# Patient Record
Sex: FEMALE | Race: WHITE | NOT HISPANIC OR LATINO | Employment: FULL TIME | URBAN - METROPOLITAN AREA
[De-identification: names, ages, dates, MRNs, and addresses within clinical notes are randomized per-mention and may not be internally consistent; named-entity substitution may affect disease eponyms.]

---

## 2017-05-24 ENCOUNTER — OFFICE VISIT (OUTPATIENT)
Dept: URGENT CARE | Facility: CLINIC | Age: 37
End: 2017-05-24
Payer: COMMERCIAL

## 2017-05-24 VITALS
OXYGEN SATURATION: 97 % | WEIGHT: 211 LBS | HEART RATE: 88 BPM | TEMPERATURE: 98.2 F | DIASTOLIC BLOOD PRESSURE: 74 MMHG | HEIGHT: 65 IN | RESPIRATION RATE: 16 BRPM | SYSTOLIC BLOOD PRESSURE: 112 MMHG | BODY MASS INDEX: 35.16 KG/M2

## 2017-05-24 DIAGNOSIS — B96.89 ACUTE BACTERIAL SINUSITIS: ICD-10-CM

## 2017-05-24 DIAGNOSIS — R05.9 COUGH: ICD-10-CM

## 2017-05-24 DIAGNOSIS — J01.90 ACUTE BACTERIAL SINUSITIS: ICD-10-CM

## 2017-05-24 PROCEDURE — 99214 OFFICE O/P EST MOD 30 MIN: CPT | Performed by: NURSE PRACTITIONER

## 2017-05-24 RX ORDER — BENZONATATE 200 MG/1
200 CAPSULE ORAL 3 TIMES DAILY PRN
Qty: 60 CAP | Refills: 0 | Status: SHIPPED | OUTPATIENT
Start: 2017-05-24 | End: 2017-07-30

## 2017-05-24 RX ORDER — GABAPENTIN 300 MG/1
300 CAPSULE ORAL 3 TIMES DAILY
COMMUNITY

## 2017-05-24 RX ORDER — AMOXICILLIN AND CLAVULANATE POTASSIUM 875; 125 MG/1; MG/1
1 TABLET, FILM COATED ORAL 2 TIMES DAILY
Qty: 14 TAB | Refills: 0 | Status: SHIPPED | OUTPATIENT
Start: 2017-05-24 | End: 2017-07-30

## 2017-05-24 ASSESSMENT — ENCOUNTER SYMPTOMS
SHORTNESS OF BREATH: 1
CHILLS: 0
SORE THROAT: 1
NAUSEA: 0
WHEEZING: 0
COUGH: 1
MYALGIAS: 0
RHINORRHEA: 1
DIZZINESS: 0
HEADACHES: 1
FEVER: 0
VOMITING: 0

## 2017-05-24 NOTE — MR AVS SNAPSHOT
"        Nicole C Ashley   2017 9:45 AM   Office Visit   MRN: [id number]    Department:  [facility]   Dept Phone:  [phone]    Description:  Female : 1980   Provider:  MARY JANE Starks           Reason for Visit     Cough x 2 weeks/ with mucus/running nose and sore throat      Allergies as of 2017     Allergen Noted Reactions    Wellbutrin [Bupropion] 2015       Rapid HR      You were diagnosed with     Acute bacterial sinusitis   [714952]       Cough   [786.2.ICD-9-CM]         Vital Signs     Blood Pressure Pulse Temperature Respirations Height Weight    112/74 mmHg 88 36.8 °C (98.2 °F) 16 1.651 m (5' 5\") 95.709 kg (211 lb)    Body Mass Index Oxygen Saturation Last Menstrual Period Breastfeeding? Smoking Status       35.11 kg/m2 97% 2016 No Former Smoker       Basic Information     Date Of Birth Sex Race Ethnicity Preferred Language    1980 Female White Non- English      Problem List              ICD-10-CM Priority Class Noted - Resolved    Gluteal tendinitis M76.00   6/3/2011 - Present    Neck pain M54.2   2016 - Present    Health care maintenance Z00.00   2016 - Present      Health Maintenance        Date Due Completion Dates    IMM DTaP/Tdap/Td Vaccine (1 - Tdap) 2001 ---    PAP SMEAR 2018 (Postponed)    Override on 2015: Postponed            Current Immunizations     No immunizations on file.      Below and/or attached are the medications your provider expects you to take. Review all of your home medications and newly ordered medications with your provider and/or pharmacist. Follow medication instructions as directed by your provider and/or pharmacist. Please keep your medication list with you and share with your provider. Update the information when medications are discontinued, doses are changed, or new medications (including over-the-counter products) are added; and carry medication information at all times in the " event of emergency situations     Allergies:  WELLBUTRIN - (reactions not documented)               Medications  Valid as of: May 24, 2017 - 10:02 AM    Generic Name Brand Name Tablet Size Instructions for use    Amoxicillin-Pot Clavulanate (Tab) AUGMENTIN 875-125 MG Take 1 Tab by mouth 2 times a day.        Benzonatate (Cap) TESSALON 200 MG Take 1 Cap by mouth 3 times a day as needed for Cough.        Gabapentin (Cap) NEURONTIN 300 MG Take 300 mg by mouth 3 times a day.        Omeprazole Magnesium (Tablet Delayed Response) PRILOSEC OTC 20 MG Take 1 Tab by mouth every day.        Orphenadrine Citrate (TABLET SR 12 HR) NORFLEX 100 MG Take 1 Tab by mouth 2 times a day.        TiZANidine HCl (Tab) ZANAFLEX 4 MG Take 1 Tab by mouth 3 times a day.        TraMADol HCl (Tab) ULTRAM 50 MG Take 1 Tab by mouth every 8 hours as needed. TITRATE UP TO 2 CAPSULES BY MOUTH THREE TIMES DAILY        .                 Medicines prescribed today were sent to:     [id number] - [place], [place] - [address] [address]    Phone: [phone] Fax: [phone]    Open 24 Hours?: No      Medication refill instructions:       If your prescription bottle indicates you have medication refills left, it is not necessary to call your provider’s office. Please contact your pharmacy and they will refill your medication.    If your prescription bottle indicates you do not have any refills left, you may request refills at any time through one of the following ways: The online [employer] unlimited system (except Urgent Care), by calling your provider’s office, or by asking your pharmacy to contact your provider’s office with a refill request. Medication refills are processed only during regular business hours and may not be available until the next business day. Your provider may request additional information or to have a follow-up visit with you prior to refilling your medication.   *Please Note: Medication refills are  assigned a new Rx number when refilled electronically. Your pharmacy may indicate that no refills were authorized even though a new prescription for the same medication is available at the pharmacy. Please request the medicine by name with the pharmacy before contacting your provider for a refill.           MyChart Access Code: Activation code not generated  Current [employer] Status: Active

## 2017-05-24 NOTE — PROGRESS NOTES
"Subjective:      Nicole Ramirez is a 37 y.o. female who presents with Cough            Cough  This is a new problem. The current episode started 1 to 4 weeks ago. The problem has been unchanged. The problem occurs constantly. The cough is productive of brown sputum. Associated symptoms include headaches, nasal congestion, postnasal drip, rhinorrhea, a sore throat and shortness of breath. Pertinent negatives include no chills, fever, myalgias or wheezing. She has tried OTC cough suppressant for the symptoms. The treatment provided mild relief.     Allergies, medications and history reviewed by me today    Review of Systems   Constitutional: Negative for fever and chills.   HENT: Positive for postnasal drip, rhinorrhea and sore throat.    Respiratory: Positive for cough and shortness of breath. Negative for wheezing.    Gastrointestinal: Negative for nausea and vomiting.   Musculoskeletal: Negative for myalgias.   Neurological: Positive for headaches. Negative for dizziness.          Objective:     /74 mmHg  Pulse 88  Temp(Src) 36.8 °C (98.2 °F)  Resp 16  Ht 1.651 m (5' 5\")  Wt 95.709 kg (211 lb)  BMI 35.11 kg/m2  SpO2 97%  LMP 08/18/2016  Breastfeeding? No     Physical Exam   Constitutional: She is oriented to person, place, and time. She appears well-developed and well-nourished. No distress.   HENT:   Head: Normocephalic and atraumatic.   Right Ear: External ear and ear canal normal. Tympanic membrane is not injected and not perforated. No middle ear effusion.   Left Ear: External ear and ear canal normal. Tympanic membrane is not injected and not perforated.  No middle ear effusion.   Nose: Mucosal edema present.   Mouth/Throat: Posterior oropharyngeal erythema present. No oropharyngeal exudate.   Eyes: Conjunctivae are normal. Right eye exhibits no discharge. Left eye exhibits no discharge.   Neck: Normal range of motion. Neck supple.   Cardiovascular: Normal rate, regular rhythm and normal heart " sounds.    No murmur heard.  Pulmonary/Chest: Effort normal and breath sounds normal. No respiratory distress. She has no wheezes. She has no rales.   Musculoskeletal: Normal range of motion.   Normal movement of all 4 extremities.   Lymphadenopathy:     She has no cervical adenopathy.        Right: No supraclavicular adenopathy present.        Left: No supraclavicular adenopathy present.   Neurological: She is alert and oriented to person, place, and time. Gait normal.   Skin: Skin is warm and dry.   Psychiatric: She has a normal mood and affect. Her behavior is normal. Thought content normal.   Nursing note and vitals reviewed.              Assessment/Plan:     1. Acute bacterial sinusitis  amoxicillin-clavulanate (AUGMENTIN) 875-125 MG Tab   2. Cough  benzonatate (TESSALON) 200 MG capsule     Differential diagnosis, natural history, supportive care, and indications for immediate follow-up discussed at length.

## 2017-07-29 ENCOUNTER — OFFICE VISIT (OUTPATIENT)
Dept: URGENT CARE | Facility: PHYSICIAN GROUP | Age: 37
End: 2017-07-29
Payer: COMMERCIAL

## 2017-07-29 VITALS
SYSTOLIC BLOOD PRESSURE: 122 MMHG | HEART RATE: 112 BPM | BODY MASS INDEX: 34.16 KG/M2 | DIASTOLIC BLOOD PRESSURE: 80 MMHG | OXYGEN SATURATION: 95 % | WEIGHT: 205 LBS | HEIGHT: 65 IN | TEMPERATURE: 99 F

## 2017-07-29 DIAGNOSIS — J02.9 SORE THROAT: ICD-10-CM

## 2017-07-29 DIAGNOSIS — R10.13 ACUTE EPIGASTRIC PAIN: ICD-10-CM

## 2017-07-29 DIAGNOSIS — Z87.19 H/O HIATAL HERNIA: ICD-10-CM

## 2017-07-29 DIAGNOSIS — Z87.19 H/O GASTROESOPHAGEAL REFLUX (GERD): ICD-10-CM

## 2017-07-29 LAB
INT CON NEG: NORMAL
INT CON POS: NORMAL
S PYO AG THROAT QL: NORMAL

## 2017-07-29 PROCEDURE — 99214 OFFICE O/P EST MOD 30 MIN: CPT | Performed by: NURSE PRACTITIONER

## 2017-07-29 PROCEDURE — 87880 STREP A ASSAY W/OPTIC: CPT | Performed by: NURSE PRACTITIONER

## 2017-07-29 RX ORDER — MAGNESIUM OXIDE 400 MG/1
400 TABLET ORAL DAILY
COMMUNITY
End: 2017-12-18

## 2017-07-29 RX ORDER — PANTOPRAZOLE SODIUM 40 MG/1
40 TABLET, DELAYED RELEASE ORAL DAILY
Qty: 30 TAB | Refills: 0 | Status: SHIPPED | OUTPATIENT
Start: 2017-07-29 | End: 2017-08-15

## 2017-07-29 ASSESSMENT — ENCOUNTER SYMPTOMS
ABDOMINAL PAIN: 1
WEAKNESS: 0
DIARRHEA: 0
NAUSEA: 0
COUGH: 0
VOMITING: 0
DIZZINESS: 0
HEADACHES: 0
CHILLS: 0
PALPITATIONS: 0
FEVER: 0
BACK PAIN: 0
ORTHOPNEA: 0
HEARTBURN: 1
CONSTIPATION: 0
MYALGIAS: 0
SHORTNESS OF BREATH: 0

## 2017-07-29 NOTE — PROGRESS NOTES
"Subjective:      Nicole Ramirez is a 37 y.o. female who presents with Pharyngitis            Pharyngitis   Associated symptoms include abdominal pain. Pertinent negatives include no coughing, diarrhea, headaches, shortness of breath or vomiting.   Nicole is a 37 year old female who is here for sore throat and epigastric pain. Mother present.  has had low grade fever of \"100 degrees\". C/o sore throat x 3 days. Denies n/v or abdominal pain. States taking prilosec daily but has not worked lately. States will take prilosec at night. H/o GERD, hiatal hernia. Had a barium swallow 9/2016 with dx of GERD and hiatal hernia. Placed on prilosec at that time, did not follow up with PCP with new med.  has recently \"cut stress out of life\".     PMH:  has a past medical history of Sinusitis, chronic; Allergy; and Muscle spasm.  MEDS:   Current outpatient prescriptions:   •  vitamin D (CHOLECALCIFEROL) 1000 UNIT Tab, Take 1,000 Units by mouth every day., Disp: , Rfl:   •  magnesium oxide (MAG-OX) 400 MG Tab, Take 400 mg by mouth every day., Disp: , Rfl:   •  hyoscyamine-maalox plus-lidocaine viscous (GI COCKTAIL), Take 30 mL by mouth Once for 1 dose., Disp: 30 mL, Rfl: 0  •  pantoprazole (PROTONIX) 40 MG Tablet Delayed Response, Take 1 Tab by mouth every day. Indications: Gastroesophageal Reflux Disease, Disp: 30 Tab, Rfl: 0  •  gabapentin (NEURONTIN) 300 MG Cap, Take 300 mg by mouth 3 times a day., Disp: , Rfl:   •  tramadol (ULTRAM) 50 MG Tab, Take 1 Tab by mouth every 8 hours as needed. TITRATE UP TO 2 CAPSULES BY MOUTH THREE TIMES DAILY, Disp: 90 Tab, Rfl: 1  •  omeprazole (PRILOSEC OTC) 20 MG tablet, Take 1 Tab by mouth every day., Disp: 90 Tab, Rfl: 1  •  amoxicillin-clavulanate (AUGMENTIN) 875-125 MG Tab, Take 1 Tab by mouth 2 times a day., Disp: 14 Tab, Rfl: 0  •  benzonatate (TESSALON) 200 MG capsule, Take 1 Cap by mouth 3 times a day as needed for Cough., Disp: 60 Cap, Rfl: 0  •  orphenadrine (NORFLEX) 100 MG " "tablet, Take 1 Tab by mouth 2 times a day., Disp: 60 Tab, Rfl: 1  •  tizanidine (ZANAFLEX) 4 MG Tab, Take 1 Tab by mouth 3 times a day., Disp: 30 Tab, Rfl: 5  ALLERGIES:   Allergies   Allergen Reactions   • Morphine Shortness of Breath   • Wellbutrin [Bupropion]      Rapid HR     SURGHX:   Past Surgical History   Procedure Laterality Date   • Primary c section     • Plastic surgery  2007     breast reduction     SOCHX:  reports that she quit smoking about 9 years ago. Her smoking use included Cigarettes. She quit after 14 years of use. She has never used smokeless tobacco. She reports that she does not drink alcohol or use illicit drugs.  FH: Family history was reviewed, no pertinent findings to report      Review of Systems   Constitutional: Negative for fever, chills and malaise/fatigue.   Respiratory: Negative for cough and shortness of breath.    Cardiovascular: Negative for chest pain, palpitations and orthopnea.   Gastrointestinal: Positive for heartburn and abdominal pain. Negative for nausea, vomiting, diarrhea and constipation.   Musculoskeletal: Negative for myalgias and back pain.   Neurological: Negative for dizziness, weakness and headaches.   All other systems reviewed and are negative.         Objective:     /80 mmHg  Pulse 112  Temp(Src) 37.2 °C (99 °F)  Ht 1.651 m (5' 5\")  Wt 92.987 kg (205 lb)  BMI 34.11 kg/m2  SpO2 95%  LMP 08/18/2016     Physical Exam   Constitutional: She appears well-developed and well-nourished. No distress.   HENT:   Head: Normocephalic.   Eyes: Conjunctivae and EOM are normal. Pupils are equal, round, and reactive to light.   Neck: Normal range of motion. Neck supple.   Cardiovascular: Regular rhythm and normal pulses.  Tachycardia present.    Pulses:       Radial pulses are 2+ on the right side, and 2+ on the left side.   Pulmonary/Chest: Effort normal and breath sounds normal. No accessory muscle usage. No respiratory distress.   Skin: She is not diaphoretic. "   Vitals reviewed.              Assessment/Plan:     1. Sore throat    - POCT Rapid Strep A: NEG    2. Acute epigastric pain    - hyoscyamine-maalox plus-lidocaine viscous (GI COCKTAIL); Take 30 mL by mouth Once for 1 dose.  Dispense: 30 mL; Refill: 0  - pantoprazole (PROTONIX) 40 MG Tablet Delayed Response; Take 1 Tab by mouth every day. Indications: Gastroesophageal Reflux Disease  Dispense: 30 Tab; Refill: 0  - REFERRAL TO GASTROENTEROLOGY    3. H/O gastroesophageal reflux (GERD)    - pantoprazole (PROTONIX) 40 MG Tablet Delayed Response; Take 1 Tab by mouth every day. Indications: Gastroesophageal Reflux Disease  Dispense: 30 Tab; Refill: 0  - REFERRAL TO GASTROENTEROLOGY    4. H/O hiatal hernia    - REFERRAL TO GASTROENTEROLOGY    Maintain hydration status  Get rest  Practice stress reduction techniques prn  Monitor for fever, severe abdominal pain, continuous fever, n/v, unable to urinate or have bowel movement- need re-evaluation- may go to ER, patient understands this  Follow up with GI and recommend new PCP

## 2017-07-30 ENCOUNTER — HOSPITAL ENCOUNTER (EMERGENCY)
Facility: MEDICAL CENTER | Age: 37
End: 2017-07-31
Attending: EMERGENCY MEDICINE
Payer: COMMERCIAL

## 2017-07-30 DIAGNOSIS — R10.13 EPIGASTRIC PAIN: ICD-10-CM

## 2017-07-30 LAB
ALBUMIN SERPL BCP-MCNC: 4.1 G/DL (ref 3.2–4.9)
ALBUMIN/GLOB SERPL: 1.2 G/DL
ALP SERPL-CCNC: 71 U/L (ref 30–99)
ALT SERPL-CCNC: 43 U/L (ref 2–50)
ANION GAP SERPL CALC-SCNC: 11 MMOL/L (ref 0–11.9)
AST SERPL-CCNC: 34 U/L (ref 12–45)
BASOPHILS # BLD AUTO: 0.6 % (ref 0–1.8)
BASOPHILS # BLD: 0.07 K/UL (ref 0–0.12)
BILIRUB SERPL-MCNC: 0.6 MG/DL (ref 0.1–1.5)
BUN SERPL-MCNC: 9 MG/DL (ref 8–22)
CALCIUM SERPL-MCNC: 8.6 MG/DL (ref 8.4–10.2)
CHLORIDE SERPL-SCNC: 104 MMOL/L (ref 96–112)
CO2 SERPL-SCNC: 21 MMOL/L (ref 20–33)
CREAT SERPL-MCNC: 0.91 MG/DL (ref 0.5–1.4)
EKG IMPRESSION: NORMAL
EOSINOPHIL # BLD AUTO: 0.01 K/UL (ref 0–0.51)
EOSINOPHIL NFR BLD: 0.1 % (ref 0–6.9)
ERYTHROCYTE [DISTWIDTH] IN BLOOD BY AUTOMATED COUNT: 39.7 FL (ref 35.9–50)
GFR SERPL CREATININE-BSD FRML MDRD: >60 ML/MIN/1.73 M 2
GLOBULIN SER CALC-MCNC: 3.4 G/DL (ref 1.9–3.5)
GLUCOSE SERPL-MCNC: 103 MG/DL (ref 65–99)
HCG SERPL QL: NEGATIVE
HCT VFR BLD AUTO: 36.9 % (ref 37–47)
HGB BLD-MCNC: 13.1 G/DL (ref 12–16)
IMM GRANULOCYTES # BLD AUTO: 0.05 K/UL (ref 0–0.11)
IMM GRANULOCYTES NFR BLD AUTO: 0.5 % (ref 0–0.9)
LIPASE SERPL-CCNC: 28 U/L (ref 7–58)
LYMPHOCYTES # BLD AUTO: 1.59 K/UL (ref 1–4.8)
LYMPHOCYTES NFR BLD: 14.4 % (ref 22–41)
MCH RBC QN AUTO: 30.9 PG (ref 27–33)
MCHC RBC AUTO-ENTMCNC: 35.5 G/DL (ref 33.6–35)
MCV RBC AUTO: 87 FL (ref 81.4–97.8)
MONOCYTES # BLD AUTO: 0.85 K/UL (ref 0–0.85)
MONOCYTES NFR BLD AUTO: 7.7 % (ref 0–13.4)
NEUTROPHILS # BLD AUTO: 8.5 K/UL (ref 2–7.15)
NEUTROPHILS NFR BLD: 76.7 % (ref 44–72)
NRBC # BLD AUTO: 0 K/UL
NRBC BLD AUTO-RTO: 0 /100 WBC
PLATELET # BLD AUTO: 202 K/UL (ref 164–446)
PMV BLD AUTO: 10.5 FL (ref 9–12.9)
POTASSIUM SERPL-SCNC: 3.6 MMOL/L (ref 3.6–5.5)
PROT SERPL-MCNC: 7.5 G/DL (ref 6–8.2)
RBC # BLD AUTO: 4.24 M/UL (ref 4.2–5.4)
SODIUM SERPL-SCNC: 136 MMOL/L (ref 135–145)
WBC # BLD AUTO: 11.1 K/UL (ref 4.8–10.8)

## 2017-07-30 PROCEDURE — 700105 HCHG RX REV CODE 258: Performed by: EMERGENCY MEDICINE

## 2017-07-30 PROCEDURE — A9270 NON-COVERED ITEM OR SERVICE: HCPCS | Performed by: EMERGENCY MEDICINE

## 2017-07-30 PROCEDURE — 85025 COMPLETE CBC W/AUTO DIFF WBC: CPT

## 2017-07-30 PROCEDURE — 700102 HCHG RX REV CODE 250 W/ 637 OVERRIDE(OP): Performed by: EMERGENCY MEDICINE

## 2017-07-30 PROCEDURE — 83690 ASSAY OF LIPASE: CPT

## 2017-07-30 PROCEDURE — 84703 CHORIONIC GONADOTROPIN ASSAY: CPT

## 2017-07-30 PROCEDURE — 99284 EMERGENCY DEPT VISIT MOD MDM: CPT

## 2017-07-30 PROCEDURE — 36415 COLL VENOUS BLD VENIPUNCTURE: CPT

## 2017-07-30 PROCEDURE — 96360 HYDRATION IV INFUSION INIT: CPT

## 2017-07-30 PROCEDURE — 93005 ELECTROCARDIOGRAM TRACING: CPT

## 2017-07-30 PROCEDURE — 80053 COMPREHEN METABOLIC PANEL: CPT

## 2017-07-30 RX ORDER — HYDROXYCHLOROQUINE SULFATE 200 MG/1
200 TABLET, FILM COATED ORAL 2 TIMES DAILY
COMMUNITY

## 2017-07-30 RX ORDER — TRAMADOL HYDROCHLORIDE 50 MG/1
50 TABLET ORAL EVERY 8 HOURS PRN
Qty: 16 TAB | Refills: 0 | Status: SHIPPED | OUTPATIENT
Start: 2017-07-30 | End: 2017-07-30

## 2017-07-30 RX ORDER — HYDROCODONE BITARTRATE AND ACETAMINOPHEN 5; 325 MG/1; MG/1
1 TABLET ORAL ONCE
Status: COMPLETED | OUTPATIENT
Start: 2017-07-30 | End: 2017-07-30

## 2017-07-30 RX ORDER — SODIUM CHLORIDE 9 MG/ML
1000 INJECTION, SOLUTION INTRAVENOUS ONCE
Status: COMPLETED | OUTPATIENT
Start: 2017-07-30 | End: 2017-07-30

## 2017-07-30 RX ORDER — HYDROCODONE BITARTRATE AND ACETAMINOPHEN 5; 325 MG/1; MG/1
1-2 TABLET ORAL EVERY 6 HOURS PRN
Qty: 16 TAB | Refills: 0 | Status: SHIPPED | OUTPATIENT
Start: 2017-07-30 | End: 2017-08-15

## 2017-07-30 RX ADMIN — SODIUM CHLORIDE 1000 ML: 9 INJECTION, SOLUTION INTRAVENOUS at 22:34

## 2017-07-30 RX ADMIN — HYDROCODONE BITARTRATE AND ACETAMINOPHEN 1 TABLET: 5; 325 TABLET ORAL at 22:34

## 2017-07-30 RX ADMIN — LIDOCAINE HYDROCHLORIDE 30 ML: 20 SOLUTION OROPHARYNGEAL at 22:35

## 2017-07-30 ASSESSMENT — PAIN SCALES - GENERAL: PAINLEVEL_OUTOF10: 7

## 2017-07-30 NOTE — ED AVS SNAPSHOT
7/31/2017    Nicole Ramirez  [address] [facility] [place] [address]    Dear Nicole MARIN:    [facility] wants to ensure your discharge home is safe and you or your loved ones have had all of your questions answered regarding your care after you leave the hospital.    Below is a list of resources and contact information should you have any questions regarding your hospital stay, follow-up instructions, or active medical symptoms.    Questions or Concerns Regarding… Contact   Medical Questions Related to Your Discharge  (7 days a week, 8am-5pm) Contact a Nurse Care Coordinator   [phone]   Medical Questions Not Related to Your Discharge  (24 hours a day / 7 days a week)  Contact the Nurse Health Line   [phone]    Medications or Discharge Instructions Refer to your discharge packet   or contact your [facility] Primary Care Provider   [phone]   Follow-up Appointment(s) Schedule your appointment via [facility]   or contact Scheduling [phone]   Billing Review your statement via [facility]  or contact Billing [phone]   Medical Records Review your records via [facility]   or contact Medical Records [phone]     You may receive a telephone call within two days of discharge. This call is to make certain you understand your discharge instructions and have the opportunity to have any questions answered. You can also easily access your medical information, test results and upcoming appointments via the [facility] free online health management tool. You can learn more and sign up at [employer]. For assistance setting up your [facility] account, please call [phone].    Once again, we want to ensure your discharge home is safe and that you have a clear understanding of any next steps in your care. If you have any questions or concerns, please do not hesitate to contact us, we are here for you. Thank you for choosing [facility] for your healthcare needs.    Sincerely,    Your [facility] Healthcare Team

## 2017-07-30 NOTE — ED AVS SNAPSHOT
Home Care Instructions                                                                                                                Nicole Ramirez   MRN: [id number]    Department:  [facility], Emergency Dept   Date of Visit:  7/30/2017            [facility], Emergency Dept    [address]    [place] [address]    Phone:  [phone]      You were seen by     Vitor Reece M.D.      Your Diagnosis Was     Epigastric pain     R10.13       These are the medications you received during your hospitalization from 07/30/2017 2128 to 07/31/2017 0005     Date/Time Order Dose Route Action    07/30/2017 2234 NS infusion 1,000 mL 1,000 mL Intravenous New Bag    07/30/2017 2234 hydrocodone-acetaminophen (NORCO) 5-325 MG per tablet 1 Tab 1 Tab Oral Given    07/30/2017 2235 hyoscyamine-maalox plus-lidocaine viscous (GI COCKTAIL) oral susp 30 mL 30 mL Oral Given      Follow-up Information     1. Follow up with Kelli Phillips M.D..    Specialty:  Family Medicine    Contact information    [address] [facility]  [address]  [facility] [address] [phone]          2. Follow up with [facility], Emergency Dept.    Specialty:  Emergency Medicine    Why:  As needed, If symptoms worsen    Contact information    [address] [facility] [address] [phone]        3. Schedule an appointment as soon as possible for a visit with [facility].    Contact information    [address] [facility] [address] [phone]      Medication Information     Review all of your home medications and newly ordered medications with your primary doctor and/or pharmacist as soon as possible. Follow medication instructions as directed by your doctor and/or pharmacist.     Please keep your complete medication list with you and share with your physician. Update the information when medications are discontinued, doses are changed, or new  medications (including over-the-counter products) are added; and carry medication information at all times in the event of emergency situations.               Medication List      START taking these medications        Instructions    Morning Afternoon Evening Bedtime    hydrocodone-acetaminophen 5-325 MG Tabs per tablet   Last time this was given:  1 Tab on 7/30/2017 10:34 PM   Commonly known as:  NORCO        Take 1-2 Tabs by mouth every 6 hours as needed.   Dose:  1-2 Tab                          ASK your doctor about these medications        Instructions    Morning Afternoon Evening Bedtime    gabapentin 300 MG Caps   Commonly known as:  NEURONTIN        Take 300 mg by mouth 3 times a day.   Dose:  300 mg                        hydroxychloroquine 200 MG Tabs   Commonly known as:  PLAQUENIL        Take 200 mg by mouth every day.   Dose:  200 mg                        magnesium oxide 400 MG Tabs   Commonly known as:  MAG-OX        Take 400 mg by mouth every day.   Dose:  400 mg                        omeprazole 20 MG tablet   Commonly known as:  PRILOSEC OTC        Doctor's comments:  Take one tablet twice daily for one week and then continue one tablet in the morning   Take 1 Tab by mouth every day.   Dose:  20 mg                        orphenadrine 100 MG tablet   Commonly known as:  NORFLEX        Take 1 Tab by mouth 2 times a day.   Dose:  100 mg                        pantoprazole 40 MG Tbec   Commonly known as:  PROTONIX        Take 1 Tab by mouth every day. Indications: Gastroesophageal Reflux Disease   Dose:  40 mg                        vitamin D 1000 UNIT Tabs   Commonly known as:  cholecalciferol        Take 1,000 Units by mouth every day.   Dose:  1000 Units                             Where to Get Your Medications      You can get these medications from any pharmacy     Bring a paper prescription for each of these medications    - hydrocodone-acetaminophen 5-325 MG Tabs per tablet            Procedures  and tests performed during your visit     BETA-HCG QUALITATIVE SERUM    CBC WITH DIFFERENTIAL    CMP    EKG (NOW)    ESTIMATED GFR    LIPASE        Discharge Instructions       Abdominal Pain, Adult  Many things can cause abdominal pain. Usually, abdominal pain is not caused by a disease and will improve without treatment. It can often be observed and treated at home. Your health care provider will do a physical exam and possibly order blood tests and X-rays to help determine the seriousness of your pain. However, in many cases, more time must pass before a clear cause of the pain can be found. Before that point, your health care provider may not know if you need more testing or further treatment.  HOME CARE INSTRUCTIONS   Monitor your abdominal pain for any changes. The following actions may help to alleviate any discomfort you are experiencing:  · Only take over-the-counter or prescription medicines as directed by your health care provider.  · Do not take laxatives unless directed to do so by your health care provider.  · Try a clear liquid diet (broth, tea, or water) as directed by your health care provider. Slowly move to a bland diet as tolerated.  SEEK MEDICAL CARE IF:  · You have unexplained abdominal pain.  · You have abdominal pain associated with nausea or diarrhea.  · You have pain when you urinate or have a bowel movement.  · You experience abdominal pain that wakes you in the night.  · You have abdominal pain that is worsened or improved by eating food.  · You have abdominal pain that is worsened with eating fatty foods.  · You have a fever.  SEEK IMMEDIATE MEDICAL CARE IF:   · Your pain does not go away within 2 hours.  · You keep throwing up (vomiting).  · Your pain is felt only in portions of the abdomen, such as the right side or the left lower portion of the abdomen.  · You pass bloody or black tarry stools.  MAKE SURE YOU:  · Understand these instructions.    · Will watch your condition.    · Will  get help right away if you are not doing well or get worse.       This information is not intended to replace advice given to you by your health care provider. Make sure you discuss any questions you have with your health care provider.     Document Released: 09/27/2006 Document Revised: 01/08/2016 Document Reviewed: 08/27/2014  [employer] Interactive Patient Education ©2016 [employer]            Patient Information     Patient Information    Following emergency treatment: all patient requiring follow-up care must return either to a private physician or a clinic if your condition worsens before you are able to obtain further medical attention, please return to the emergency room.     Billing Information    At [facility], we work to make the billing process streamlined for our patients.  Our Representatives are here to answer any questions you may have regarding your hospital bill.  If you have insurance coverage and have supplied your insurance information to us, we will submit a claim to your insurer on your behalf.  Should you have any questions regarding your bill, we can be reached online or by phone as follows:  Online: You are able pay your bills online or live chat with our representatives about any billing questions you may have. We are here to help Monday - Friday from 8:00am to 7:30pm and 9:00am - 12:00pm on Saturdays.  Please visit [email or web address]  for more information.   Phone:  [phone] or [phone]    Please note that your emergency physician, surgeon, pathologist, radiologist, anesthesiologist, and other specialists are not employed by [facility] and will therefore bill separately for their services.  Please contact them directly for any questions concerning their bills at the numbers below:     Emergency Physician Services:  [phone]  [facility]:  [phone]  [facility]:  [phone]  [facility]:   [phone]    1. Your final bill may vary from the amount quoted upon discharge if all procedures are not complete at that time, or if your doctor has additional procedures of which we are not aware. You will receive an additional bill if you return to the Emergency Department at [facility] for suture removal regardless of the facility of which the sutures were placed.     2. Please arrange for settlement of this account at the emergency registration.    3. All self-pay accounts are due in full at the time of treatment.  If you are unable to meet this obligation then payment is expected within 4-5 days.     4. If you have had radiology studies (CT, X-ray, Ultrasound, MRI), you have received a preliminary result during your emergency department visit. Please contact the radiology department [phone] to receive a copy of your final result. Please discuss the Final result with your primary physician or with the follow up physician provided.     Crisis Hotline:  [place] Crisis Hotline:  0-888-CRWRUGX or 1-495.571.8602  [place] Crisis Hotline:    1-622.920.9724 or 541-171-2545         ED Discharge Follow Up Questions    1. In order to provide you with very good care, we would like to follow up with a phone call in the next few days.  May we have your permission to contact you?     YES /  NO    2. What is the best phone number to call you? (       )_____-__________    3. What is the best time to call you?      Morning  /  Afternoon  /  Evening                   Patient Signature:  ____________________________________________________________    Date:  ____________________________________________________________

## 2017-07-30 NOTE — ED AVS SNAPSHOT
Waveborn Access Code: Activation code not generated  Current Waveborn Status: Active    Virtual View Apphart  A secure, online tool to manage your health information     [employer] Waveborn® is a secure, online tool that connects you to your personalized health information from the privacy of your home -- day or night - making it very easy for you to manage your healthcare. Once the activation process is completed, you can even access your medical information using the Waveborn elton, which is available for free in the Apple Elton store or Google Play store.     Waveborn provides the following levels of access (as shown below):   My Chart Features   [facility] Primary Care Doctor [facility]  Specialists [facility]  Urgent  Care Non-[facility]  Primary Care  Doctor   Email your healthcare team securely and privately 24/7 X X X X   Manage appointments: schedule your next appointment; view details of past/upcoming appointments X      Request prescription refills. X      View recent personal medical records, including lab and immunizations X X X X   View health record, including health history, allergies, medications X X X X   Read reports about your outpatient visits, procedures, consult and ER notes X X X X   See your discharge summary, which is a recap of your hospital and/or ER visit that includes your diagnosis, lab results, and care plan. X X       How to register for Waveborn:  1. Go to  [email or web address].  2. Click on the Sign Up Now box, which takes you to the New Member Sign Up page. You will need to provide the following information:  a. Enter your Waveborn Access Code exactly as it appears at the top of this page. (You will not need to use this code after you’ve completed the sign-up process. If you do not sign up before the expiration date, you must request a new code.)   b. Enter your date of birth.   c. Enter your home email address.   d. Click Submit, and follow the next screen’s instructions.  3. Create a Waveborn ID. This will  be your [employer] login ID and cannot be changed, so think of one that is secure and easy to remember.  4. Create a Widet[employer]ix password. You can change your password at any time.  5. Enter your Password Reset Question and Answer. This can be used at a later time if you forget your password.   6. Enter your e-mail address. This allows you to receive e-mail notifications when new information is available in [employer]et[employer]ix.  7. Click Sign Up. You can now view your health information.    For assistance activating your [employer]et[employer] account, call [phone]

## 2017-07-31 ENCOUNTER — APPOINTMENT (OUTPATIENT)
Dept: RADIOLOGY | Facility: MEDICAL CENTER | Age: 37
DRG: 419 | End: 2017-07-31
Attending: EMERGENCY MEDICINE
Payer: COMMERCIAL

## 2017-07-31 ENCOUNTER — OFFICE VISIT (OUTPATIENT)
Dept: MEDICAL GROUP | Facility: MEDICAL CENTER | Age: 37
End: 2017-07-31
Payer: COMMERCIAL

## 2017-07-31 ENCOUNTER — HOSPITAL ENCOUNTER (OUTPATIENT)
Facility: MEDICAL CENTER | Age: 37
End: 2017-07-31
Attending: PHYSICIAN ASSISTANT
Payer: COMMERCIAL

## 2017-07-31 ENCOUNTER — HOSPITAL ENCOUNTER (INPATIENT)
Facility: MEDICAL CENTER | Age: 37
LOS: 1 days | DRG: 419 | End: 2017-08-01
Attending: EMERGENCY MEDICINE | Admitting: SURGERY
Payer: COMMERCIAL

## 2017-07-31 VITALS
RESPIRATION RATE: 18 BRPM | TEMPERATURE: 97.8 F | BODY MASS INDEX: 35.16 KG/M2 | HEIGHT: 65 IN | DIASTOLIC BLOOD PRESSURE: 78 MMHG | HEART RATE: 96 BPM | SYSTOLIC BLOOD PRESSURE: 122 MMHG | WEIGHT: 211 LBS | OXYGEN SATURATION: 98 %

## 2017-07-31 VITALS
RESPIRATION RATE: 18 BRPM | TEMPERATURE: 98.1 F | HEIGHT: 65 IN | SYSTOLIC BLOOD PRESSURE: 112 MMHG | OXYGEN SATURATION: 98 % | DIASTOLIC BLOOD PRESSURE: 76 MMHG | WEIGHT: 211.64 LBS | HEART RATE: 95 BPM | BODY MASS INDEX: 35.26 KG/M2

## 2017-07-31 DIAGNOSIS — J03.90 ACUTE TONSILLITIS, UNSPECIFIED ETIOLOGY: ICD-10-CM

## 2017-07-31 DIAGNOSIS — R10.13 EPIGASTRIC PAIN: ICD-10-CM

## 2017-07-31 DIAGNOSIS — E66.9 OBESITY (BMI 30-39.9): ICD-10-CM

## 2017-07-31 DIAGNOSIS — J02.9 PHARYNGITIS, UNSPECIFIED ETIOLOGY: ICD-10-CM

## 2017-07-31 PROBLEM — K80.00 CHOLECYSTITIS, ACUTE WITH CHOLELITHIASIS: Status: ACTIVE | Noted: 2017-07-31

## 2017-07-31 LAB
ALBUMIN SERPL BCP-MCNC: 3.8 G/DL (ref 3.2–4.9)
ALBUMIN/GLOB SERPL: 1 G/DL
ALP SERPL-CCNC: 75 U/L (ref 30–99)
ALT SERPL-CCNC: 43 U/L (ref 2–50)
ANION GAP SERPL CALC-SCNC: 13 MMOL/L (ref 0–11.9)
APPEARANCE UR: ABNORMAL
AST SERPL-CCNC: 30 U/L (ref 12–45)
BACTERIA #/AREA URNS HPF: ABNORMAL /HPF
BASOPHILS # BLD AUTO: 0.3 % (ref 0–1.8)
BASOPHILS # BLD: 0.03 K/UL (ref 0–0.12)
BILIRUB SERPL-MCNC: 0.6 MG/DL (ref 0.1–1.5)
BILIRUB UR QL STRIP.AUTO: ABNORMAL
BUN SERPL-MCNC: 8 MG/DL (ref 8–22)
CALCIUM SERPL-MCNC: 8.7 MG/DL (ref 8.4–10.2)
CHLORIDE SERPL-SCNC: 105 MMOL/L (ref 96–112)
CO2 SERPL-SCNC: 19 MMOL/L (ref 20–33)
COLOR UR: YELLOW
CREAT SERPL-MCNC: 0.84 MG/DL (ref 0.5–1.4)
CRYSTALS 1718B: ABNORMAL /HPF
CRYSTALS 1718C: ABNORMAL /HPF
CULTURE IF INDICATED INDCX: YES UA CULTURE
EOSINOPHIL # BLD AUTO: 0.02 K/UL (ref 0–0.51)
EOSINOPHIL NFR BLD: 0.2 % (ref 0–6.9)
EPI CELLS #/AREA URNS HPF: ABNORMAL /HPF
ERYTHROCYTE [DISTWIDTH] IN BLOOD BY AUTOMATED COUNT: 39.2 FL (ref 35.9–50)
GFR SERPL CREATININE-BSD FRML MDRD: >60 ML/MIN/1.73 M 2
GLOBULIN SER CALC-MCNC: 3.7 G/DL (ref 1.9–3.5)
GLUCOSE SERPL-MCNC: 91 MG/DL (ref 65–99)
GLUCOSE UR STRIP.AUTO-MCNC: NEGATIVE MG/DL
HCG SERPL QL: NEGATIVE
HCT VFR BLD AUTO: 39 % (ref 37–47)
HGB BLD-MCNC: 13.7 G/DL (ref 12–16)
IMM GRANULOCYTES # BLD AUTO: 0.04 K/UL (ref 0–0.11)
IMM GRANULOCYTES NFR BLD AUTO: 0.4 % (ref 0–0.9)
INT CON NEG: NEGATIVE
INT CON POS: POSITIVE
KETONES UR STRIP.AUTO-MCNC: >=80 MG/DL
LEUKOCYTE ESTERASE UR QL STRIP.AUTO: NEGATIVE
LIPASE SERPL-CCNC: 30 U/L (ref 7–58)
LYMPHOCYTES # BLD AUTO: 0.94 K/UL (ref 1–4.8)
LYMPHOCYTES NFR BLD: 8.5 % (ref 22–41)
MCH RBC QN AUTO: 30.5 PG (ref 27–33)
MCHC RBC AUTO-ENTMCNC: 35.1 G/DL (ref 33.6–35)
MCV RBC AUTO: 86.9 FL (ref 81.4–97.8)
MICRO URNS: ABNORMAL
MONOCYTES # BLD AUTO: 0.31 K/UL (ref 0–0.85)
MONOCYTES NFR BLD AUTO: 2.8 % (ref 0–13.4)
MUCOUS THREADS #/AREA URNS HPF: ABNORMAL /HPF
NEUTROPHILS # BLD AUTO: 9.72 K/UL (ref 2–7.15)
NEUTROPHILS NFR BLD: 87.8 % (ref 44–72)
NITRITE UR QL STRIP.AUTO: NEGATIVE
NRBC # BLD AUTO: 0 K/UL
NRBC BLD AUTO-RTO: 0 /100 WBC
PH UR STRIP.AUTO: 8 [PH]
PLATELET # BLD AUTO: 233 K/UL (ref 164–446)
PMV BLD AUTO: 10.6 FL (ref 9–12.9)
POTASSIUM SERPL-SCNC: 4 MMOL/L (ref 3.6–5.5)
PROT SERPL-MCNC: 7.5 G/DL (ref 6–8.2)
PROT UR QL STRIP: NEGATIVE MG/DL
RBC # BLD AUTO: 4.49 M/UL (ref 4.2–5.4)
RBC # URNS HPF: ABNORMAL /HPF
RBC UR QL AUTO: ABNORMAL
S PYO AG THROAT QL: NORMAL
SODIUM SERPL-SCNC: 137 MMOL/L (ref 135–145)
SP GR UR STRIP.AUTO: 1.02
UNIDENT CRYS URNS QL MICRO: ABNORMAL /HPF
WBC # BLD AUTO: 11.1 K/UL (ref 4.8–10.8)
WBC #/AREA URNS HPF: ABNORMAL /HPF
YEAST #/AREA URNS HPF: ABNORMAL /HPF

## 2017-07-31 PROCEDURE — 36415 COLL VENOUS BLD VENIPUNCTURE: CPT

## 2017-07-31 PROCEDURE — 87086 URINE CULTURE/COLONY COUNT: CPT

## 2017-07-31 PROCEDURE — A9270 NON-COVERED ITEM OR SERVICE: HCPCS | Performed by: SURGERY

## 2017-07-31 PROCEDURE — 96374 THER/PROPH/DIAG INJ IV PUSH: CPT

## 2017-07-31 PROCEDURE — 76705 ECHO EXAM OF ABDOMEN: CPT

## 2017-07-31 PROCEDURE — 700101 HCHG RX REV CODE 250

## 2017-07-31 PROCEDURE — 87880 STREP A ASSAY W/OPTIC: CPT | Performed by: PHYSICIAN ASSISTANT

## 2017-07-31 PROCEDURE — 99214 OFFICE O/P EST MOD 30 MIN: CPT | Performed by: PHYSICIAN ASSISTANT

## 2017-07-31 PROCEDURE — 87070 CULTURE OTHR SPECIMN AEROBIC: CPT

## 2017-07-31 PROCEDURE — 96375 TX/PRO/DX INJ NEW DRUG ADDON: CPT

## 2017-07-31 PROCEDURE — 84703 CHORIONIC GONADOTROPIN ASSAY: CPT

## 2017-07-31 PROCEDURE — 700111 HCHG RX REV CODE 636 W/ 250 OVERRIDE (IP): Performed by: SURGERY

## 2017-07-31 PROCEDURE — 700105 HCHG RX REV CODE 258: Performed by: EMERGENCY MEDICINE

## 2017-07-31 PROCEDURE — 700111 HCHG RX REV CODE 636 W/ 250 OVERRIDE (IP): Performed by: EMERGENCY MEDICINE

## 2017-07-31 PROCEDURE — 770001 HCHG ROOM/CARE - MED/SURG/GYN PRIV*

## 2017-07-31 PROCEDURE — 80053 COMPREHEN METABOLIC PANEL: CPT

## 2017-07-31 PROCEDURE — 700101 HCHG RX REV CODE 250: Performed by: SURGERY

## 2017-07-31 PROCEDURE — 99285 EMERGENCY DEPT VISIT HI MDM: CPT

## 2017-07-31 PROCEDURE — 700105 HCHG RX REV CODE 258

## 2017-07-31 PROCEDURE — 83690 ASSAY OF LIPASE: CPT

## 2017-07-31 PROCEDURE — 81001 URINALYSIS AUTO W/SCOPE: CPT

## 2017-07-31 PROCEDURE — 700102 HCHG RX REV CODE 250 W/ 637 OVERRIDE(OP): Performed by: SURGERY

## 2017-07-31 PROCEDURE — 96376 TX/PRO/DX INJ SAME DRUG ADON: CPT

## 2017-07-31 PROCEDURE — 87077 CULTURE AEROBIC IDENTIFY: CPT

## 2017-07-31 PROCEDURE — 85025 COMPLETE CBC W/AUTO DIFF WBC: CPT

## 2017-07-31 PROCEDURE — 99000 SPECIMEN HANDLING OFFICE-LAB: CPT | Performed by: PHYSICIAN ASSISTANT

## 2017-07-31 RX ORDER — FAMOTIDINE 40 MG/1
40 TABLET, FILM COATED ORAL DAILY
Qty: 60 TAB | Refills: 0 | Status: SHIPPED | OUTPATIENT
Start: 2017-07-31 | End: 2017-08-15

## 2017-07-31 RX ORDER — GABAPENTIN 300 MG/1
300 CAPSULE ORAL 3 TIMES DAILY
Status: DISCONTINUED | OUTPATIENT
Start: 2017-07-31 | End: 2017-08-01 | Stop reason: HOSPADM

## 2017-07-31 RX ORDER — ONDANSETRON 2 MG/ML
4 INJECTION INTRAMUSCULAR; INTRAVENOUS EVERY 6 HOURS PRN
Status: DISCONTINUED | OUTPATIENT
Start: 2017-07-31 | End: 2017-08-01 | Stop reason: HOSPADM

## 2017-07-31 RX ORDER — PREDNISONE 20 MG/1
TABLET ORAL
Qty: 30 TAB | Refills: 0 | Status: SHIPPED | OUTPATIENT
Start: 2017-07-31 | End: 2017-08-15

## 2017-07-31 RX ORDER — ONDANSETRON 2 MG/ML
4 INJECTION INTRAMUSCULAR; INTRAVENOUS ONCE
Status: COMPLETED | OUTPATIENT
Start: 2017-07-31 | End: 2017-07-31

## 2017-07-31 RX ORDER — SODIUM CHLORIDE 9 MG/ML
1000 INJECTION, SOLUTION INTRAVENOUS ONCE
Status: COMPLETED | OUTPATIENT
Start: 2017-07-31 | End: 2017-07-31

## 2017-07-31 RX ORDER — HYDROXYCHLOROQUINE SULFATE 200 MG/1
200 TABLET, FILM COATED ORAL DAILY
Status: DISCONTINUED | OUTPATIENT
Start: 2017-08-01 | End: 2017-08-01 | Stop reason: HOSPADM

## 2017-07-31 RX ORDER — PREDNISONE 20 MG/1
20 TABLET ORAL 3 TIMES DAILY
Status: DISCONTINUED | OUTPATIENT
Start: 2017-07-31 | End: 2017-08-01 | Stop reason: HOSPADM

## 2017-07-31 RX ORDER — FAMOTIDINE 20 MG/1
20 TABLET, FILM COATED ORAL 2 TIMES DAILY
Status: DISCONTINUED | OUTPATIENT
Start: 2017-07-31 | End: 2017-07-31 | Stop reason: HOSPADM

## 2017-07-31 RX ORDER — DEXTROSE MONOHYDRATE, SODIUM CHLORIDE, AND POTASSIUM CHLORIDE 50; 1.49; 9 G/1000ML; G/1000ML; G/1000ML
INJECTION, SOLUTION INTRAVENOUS CONTINUOUS
Status: DISCONTINUED | OUTPATIENT
Start: 2017-07-31 | End: 2017-08-01 | Stop reason: HOSPADM

## 2017-07-31 RX ORDER — AMOXICILLIN 875 MG/1
875 TABLET, COATED ORAL 2 TIMES DAILY
Qty: 20 TAB | Refills: 0 | Status: SHIPPED | OUTPATIENT
Start: 2017-07-31 | End: 2017-08-10

## 2017-07-31 RX ORDER — ORPHENADRINE CITRATE 100 MG/1
100 TABLET, EXTENDED RELEASE ORAL 2 TIMES DAILY
Status: DISCONTINUED | OUTPATIENT
Start: 2017-07-31 | End: 2017-07-31

## 2017-07-31 RX ADMIN — CEFOTETAN DISODIUM: 1 INJECTION, POWDER, FOR SOLUTION INTRAMUSCULAR; INTRAVENOUS at 22:56

## 2017-07-31 RX ADMIN — GABAPENTIN 300 MG: 300 CAPSULE ORAL at 22:55

## 2017-07-31 RX ADMIN — ONDANSETRON 4 MG: 2 INJECTION INTRAMUSCULAR; INTRAVENOUS at 20:04

## 2017-07-31 RX ADMIN — HYDROMORPHONE HYDROCHLORIDE 1 MG: 1 INJECTION, SOLUTION INTRAMUSCULAR; INTRAVENOUS; SUBCUTANEOUS at 20:04

## 2017-07-31 RX ADMIN — SODIUM CHLORIDE 1000 ML: 9 INJECTION, SOLUTION INTRAVENOUS at 20:03

## 2017-07-31 RX ADMIN — PREDNISONE 20 MG: 20 TABLET ORAL at 22:55

## 2017-07-31 RX ADMIN — POTASSIUM CHLORIDE, DEXTROSE MONOHYDRATE AND SODIUM CHLORIDE: 150; 5; 900 INJECTION, SOLUTION INTRAVENOUS at 22:55

## 2017-07-31 RX ADMIN — FAMOTIDINE 20 MG: 10 INJECTION, SOLUTION INTRAVENOUS at 22:55

## 2017-07-31 RX ADMIN — HYDROMORPHONE HYDROCHLORIDE 1 MG: 1 INJECTION, SOLUTION INTRAMUSCULAR; INTRAVENOUS; SUBCUTANEOUS at 22:20

## 2017-07-31 RX ADMIN — HYDROMORPHONE HYDROCHLORIDE 0.5 MG: 1 INJECTION, SOLUTION INTRAMUSCULAR; INTRAVENOUS; SUBCUTANEOUS at 21:45

## 2017-07-31 ASSESSMENT — LIFESTYLE VARIABLES
CONSUMPTION TOTAL: NEGATIVE
HAVE YOU EVER FELT YOU SHOULD CUT DOWN ON YOUR DRINKING: NO
TOTAL SCORE: 0
EVER HAD A DRINK FIRST THING IN THE MORNING TO STEADY YOUR NERVES TO GET RID OF A HANGOVER: NO
ON A TYPICAL DAY WHEN YOU DRINK ALCOHOL HOW MANY DRINKS DO YOU HAVE: 2
TOTAL SCORE: 0
AVERAGE NUMBER OF DAYS PER WEEK YOU HAVE A DRINK CONTAINING ALCOHOL: 0
EVER FELT BAD OR GUILTY ABOUT YOUR DRINKING: NO
DO YOU DRINK ALCOHOL: YES
HOW MANY TIMES IN THE PAST YEAR HAVE YOU HAD 5 OR MORE DRINKS IN A DAY: 0
TOTAL SCORE: 0
EVER_SMOKED: YES
HAVE PEOPLE ANNOYED YOU BY CRITICIZING YOUR DRINKING: NO

## 2017-07-31 ASSESSMENT — PAIN SCALES - GENERAL
PAINLEVEL_OUTOF10: 4
PAINLEVEL_OUTOF10: 2
PAINLEVEL_OUTOF10: 3
PAINLEVEL_OUTOF10: 8
PAINLEVEL_OUTOF10: 2
PAINLEVEL_OUTOF10: 9

## 2017-07-31 NOTE — IP AVS SNAPSHOT
8/1/2017    Nicole Ramirez  [address] [facility] [place] [address]    Dear Nicole MARIN:    Critical access hospital wants to ensure your discharge home is safe and you or your loved ones have had all of your questions answered regarding your care after you leave the hospital.    Below is a list of resources and contact information should you have any questions regarding your hospital stay, follow-up instructions, or active medical symptoms.    Questions or Concerns Regarding… Contact   Medical Questions Related to Your Discharge  (7 days a week, 8am-5pm) Contact a Nurse Care Coordinator   [phone]   Medical Questions Not Related to Your Discharge  (24 hours a day / 7 days a week)  Contact the Nurse Health Line   [phone]    Medications or Discharge Instructions Refer to your discharge packet   or contact your [facility] Primary Care Provider   [phone]   Follow-up Appointment(s) Schedule your appointment via Bloc   or contact Scheduling [phone]   Billing Review your statement via Bloc  or contact Billing [phone]   Medical Records Review your records via Bloc   or contact Medical Records [phone]     You may receive a telephone call within two days of discharge. This call is to make certain you understand your discharge instructions and have the opportunity to have any questions answered. You can also easily access your medical information, test results and upcoming appointments via the Bloc free online health management tool. You can learn more and sign up at [employer]/Bloc. For assistance setting up your Bloc account, please call [phone].    Once again, we want to ensure your discharge home is safe and that you have a clear understanding of any next steps in your care. If you have any questions or concerns, please do not hesitate to contact us, we are here for you. Thank you for choosing [facility] for your healthcare needs.    Sincerely,    Your [facility] Healthcare Team

## 2017-07-31 NOTE — IP AVS SNAPSHOT
" <p align=\"LEFT\"><IMG SRC=\"[email or web address]\" alt=\"Image\" WIDTH=\"50%\" HEIGHT=\"200\" BORDER=\"\"></p>                   Name:Nicole Ramirez  Medical Record Number:[id number]  CSN: [id number]    YOB: 1980   Age: 37 y.o.  Sex: female  HT:1.651 m (5' 5\") WT: 96 kg (211 lb 10.3 oz)          Admit Date: 7/31/2017     Discharge Date:   Today's Date: 8/1/2017  Attending Doctor:  Rob Hanson M.D.                  Allergies:  Morphine and Wellbutrin          Follow-up Information     1. Follow up with Rob Hanson M.D. In 1 week.    Specialties:  Surgery, Radiology    Contact information    [address]  [address] [address]  [phone]           Medication List      Take these Medications        Instructions    amoxicillin 875 MG tablet   Commonly known as:  AMOXIL    Take 1 Tab by mouth 2 times a day for 10 days.   Dose:  875 mg       famotidine 40 MG Tabs   Commonly known as:  PEPCID    Take 1 Tab by mouth every day.   Dose:  40 mg       gabapentin 300 MG Caps   Commonly known as:  NEURONTIN    Take 300 mg by mouth 3 times a day.   Dose:  300 mg       hydrocodone-acetaminophen 5-325 MG Tabs per tablet   Commonly known as:  NORCO    Take 1-2 Tabs by mouth every 6 hours as needed.   Dose:  1-2 Tab       hydroxychloroquine 200 MG Tabs   Commonly known as:  PLAQUENIL    Take 200 mg by mouth every day.   Dose:  200 mg       magnesium oxide 400 MG Tabs   Commonly known as:  MAG-OX    Take 400 mg by mouth every day.   Dose:  400 mg       orphenadrine 100 MG tablet   Commonly known as:  NORFLEX    Take 1 Tab by mouth 2 times a day.   Dose:  100 mg       pantoprazole 40 MG Tbec   Commonly known as:  PROTONIX    Take 1 Tab by mouth every day. Indications: Gastroesophageal Reflux Disease   Dose:  40 mg       predniSONE 20 MG Tabs   Commonly known as:  DELTASONE    Take 3 tablets x 3 days.       vitamin D 1000 UNIT Tabs   Commonly known as:  cholecalciferol    Take 1,000 Units by mouth every day.   Dose:  " 1000 Units

## 2017-07-31 NOTE — ASSESSMENT & PLAN NOTE
This is a 37-year-old female who comes in today complaining of a sore throat for approximately 4 days. She was seen at urgent care. At that time she just had a red throat. Rapid strep test was performed that was negative. She complains of both tonsils being covered with exudate. She's had tonsillitis many times in her lifetime. This is a typical presentation. She is taking no medications for her throat. Denies any fevers. No sick contacts.

## 2017-07-31 NOTE — ED PROVIDER NOTES
CHIEF COMPLAINT  Chief Complaint   Patient presents with   • Abdominal Pain   • Chest Pain       HPI  Nicole Ramirez is a 37 y.o. female who presents with epigastric abdominal pain for the past 3 days. Reports that it is worse with eating or oral intake. Reports a burning sensation. Has a known history of hiatal hernia. She is on proton pump inhibitor currently. Was seen at urgent care yesterday. She states that she cannot swallow due to worsening pain with any oral intake. No stool or black stool. No nausea or vomiting. No fevers or recent illness. No trauma. She reports that the chest pain is a burning sensation that radiates from the epigastric region which is the primary source of the discomfort. She reports that the only thing that has helped this pain past has been GI cocktails. Denies chronic NSAID use or steroid use. No history of pancreatitis. No history of cholecystectomy.    Incidentally, the patient does have a history of chronic pain    REVIEW OF SYSTEMS  See HPI for further details. All other systems are negative.     PAST MEDICAL HISTORY   has a past medical history of Sinusitis, chronic; Allergy; and Muscle spasm.    SOCIAL HISTORY  Social History     Social History Main Topics   • Smoking status: Former Smoker -- 14 years     Types: Cigarettes     Quit date: 06/03/2008   • Smokeless tobacco: Never Used   • Alcohol Use: No   • Drug Use: No   • Sexual Activity:     Partners: Female       SURGICAL HISTORY   has past surgical history that includes primary c section and plastic surgery (2007).    CURRENT MEDICATIONS  Home Medications     Reviewed by Ancelmo Crisostomo R.N. (Registered Nurse) on 07/30/17 at 2149  Med List Status: Complete    Medication Last Dose Status    gabapentin (NEURONTIN) 300 MG Cap 7/30/2017 Active    hydroxychloroquine (PLAQUENIL) 200 MG Tab 7/30/2017 Active    magnesium oxide (MAG-OX) 400 MG Tab 7/30/2017 Active    omeprazole (PRILOSEC OTC) 20 MG tablet 7/30/2017 Active     "orphenadrine (NORFLEX) 100 MG tablet 7/30/2017 Active    pantoprazole (PROTONIX) 40 MG Tablet Delayed Response 7/30/2017 Active    tramadol (ULTRAM) 50 MG Tab PRN Active    vitamin D (CHOLECALCIFEROL) 1000 UNIT Tab 7/30/2017 Active                ALLERGIES  Allergies   Allergen Reactions   • Morphine Shortness of Breath   • Wellbutrin [Bupropion]      Rapid HR       PHYSICAL EXAM  VITAL SIGNS: /76 mmHg  Pulse 110  Temp(Src) 36.7 °C (98.1 °F)  Resp 20  Ht 1.651 m (5' 5\")  Wt 96 kg (211 lb 10.3 oz)  BMI 35.22 kg/m2  SpO2 96%  LMP 07/09/2017  Breastfeeding? Unknown  Pulse ox interpretation: I interpret this pulse ox as normal.  Constitutional: Alert in no apparent distress.  HENT: No signs of trauma, Bilateral external ears normal, Nose normal.   Eyes: Pupils are equal and reactive, Conjunctiva normal, Non-icteric.   Neck: Normal range of motion, No tenderness, Supple, No stridor.   Cardiovascular: Tachycardic rate and rhythm, no murmurs.   Thorax & Lungs: Normal breath sounds, No respiratory distress, No wheezing, No chest tenderness.   Abdomen: Bowel sounds normal, Soft, epigastric tenderness, No masses, No pulsatile masses. No peritoneal signs.  Skin: Warm, Dry, No erythema, No rash.   Back: No bony tenderness, No CVA tenderness.   Extremities: Intact distal pulses, No edema, No tenderness, No cyanosis  Neurologic: Alert , Normal motor function and gait, Normal sensory function, No focal deficits noted.   Psychiatric: Affect normal, Judgment normal, Mood normal.       DIAGNOSTIC STUDIES / PROCEDURES      LABS  Labs Reviewed   CBC WITH DIFFERENTIAL - Abnormal; Notable for the following:     WBC 11.1 (*)     Hematocrit 36.9 (*)     MCHC 35.5 (*)     Neutrophils-Polys 76.70 (*)     Lymphocytes 14.40 (*)     Neutrophils (Absolute) 8.50 (*)     All other components within normal limits   COMP METABOLIC PANEL - Abnormal; Notable for the following:     Glucose 103 (*)     All other components within normal " "limits   LIPASE   HCG QUAL SERUM   ESTIMATED GFR       RADIOLOGY  No orders to display       COURSE & MEDICAL DECISION MAKING  Pertinent Labs & Imaging studies reviewed. (See chart for details)  37 y.o. female presenting with epigastric pain radiating into her chest that is a burning sensation. Her primary complaint is that she believes she a cannot swallow due to the exacerbation of pain upon swallowing. Symptoms more likely is gastritis or peptic ulcer disease. Laboratory studies were performed that were largely unremarkable. No liver abnormalities to suggest cholelithiasis/cholecystitis.    The patient was administered IV fluid hydration given presenting tachycardia and concerns for decreased oral intake with subsequent dehydration given the patient's history of decreased oral intake with the epigastric pain.    The patient did respond to GI cocktail. Attempting further acid suppression with ranitidine in addition to the Protonix that she is on. Attempting to find medications for the patient to take at home however she is on a chronic regimen of tramadol for the past several years.    Patient with strong desire to try viscous lidocaine at home for symptomatically treatment. Will provide a short course of Viscous Lidocaine as well to take as needed for her epigastric pain symptoms. This is not appear cardiac in origin. She is instructed to follow-up with GI for further management.    The patient will not drink alcohol nor drive with prescribed medications.   The patient will return for worsening symptoms or failure of improvement and is stable at the time of discharge. The patient verbalizes understanding in their own words.    /76 mmHg  Pulse 95  Temp(Src) 36.7 °C (98.1 °F)  Resp 18  Ht 1.651 m (5' 5\")  Wt 96 kg (211 lb 10.3 oz)  BMI 35.22 kg/m2  SpO2 98%  LMP 07/09/2017  Breastfeeding? Unknown    FINAL IMPRESSION  1. Epigastric pain    2.     Chronic continuous use of opioid        Electronically " signed by: Vitor Reece, 7/30/2017 10:08 PM

## 2017-07-31 NOTE — ED NOTES
IV removed, discharge instructions and rx given. Educated on when to return to ed. Pt verbalized understanding.

## 2017-07-31 NOTE — IP AVS SNAPSHOT
NanoICE Access Code: Activation code not generated  Current NanoICE Status: Active    [employer]  A secure, online tool to manage your health information     [employer]’s NanoICE® is a secure, online tool that connects you to your personalized health information from the privacy of your home -- day or night - making it very easy for you to manage your healthcare. Once the activation process is completed, you can even access your medical information using the NanoICE elton, which is available for free in the Apple Elton store or Google Play store.     NanoICE provides the following levels of access (as shown below):   My Chart Features   [facility] Primary Care Doctor [facility]  Specialists [facility]  Urgent  Care Non-[facility]  Primary Care  Doctor   Email your healthcare team securely and privately 24/7 X X X X   Manage appointments: schedule your next appointment; view details of past/upcoming appointments X      Request prescription refills. X      View recent personal medical records, including lab and immunizations X X X X   View health record, including health history, allergies, medications X X X X   Read reports about your outpatient visits, procedures, consult and ER notes X X X X   See your discharge summary, which is a recap of your hospital and/or ER visit that includes your diagnosis, lab results, and care plan. X X       How to register for NanoICE:  1. Go to  [email or web address].  2. Click on the Sign Up Now box, which takes you to the New Member Sign Up page. You will need to provide the following information:  a. Enter your NanoICE Access Code exactly as it appears at the top of this page. (You will not need to use this code after you’ve completed the sign-up process. If you do not sign up before the expiration date, you must request a new code.)   b. Enter your date of birth.   c. Enter your home email address.   d. Click Submit, and follow the next screen’s instructions.  3. Create a NanoICE ID. This will  be your Youxigu login ID and cannot be changed, so think of one that is secure and easy to remember.  4. Create a Youxigu password. You can change your password at any time.  5. Enter your Password Reset Question and Answer. This can be used at a later time if you forget your password.   6. Enter your e-mail address. This allows you to receive e-mail notifications when new information is available in Youxigu.  7. Click Sign Up. You can now view your health information.    For assistance activating your Youxigu account, call [phone]

## 2017-07-31 NOTE — PROGRESS NOTES
Subjective:   Nicole Ramirez is a 37 y.o. female here today for sore throat for almost 4 days.    Acute tonsillitis  This is a 37-year-old female who comes in today complaining of a sore throat for approximately 4 days. She was seen at urgent care. At that time she just had a red throat. Rapid strep test was performed that was negative. She complains of both tonsils being covered with exudate. She's had tonsillitis many times in her lifetime. This is a typical presentation. She is taking no medications for her throat. Denies any fevers. No sick contacts.       Current medicines (including changes today)  Current Outpatient Prescriptions   Medication Sig Dispense Refill   • predniSONE (DELTASONE) 20 MG Tab Take 3 tablets x 3 days. 30 Tab 0   • amoxicillin (AMOXIL) 875 MG tablet Take 1 Tab by mouth 2 times a day for 10 days. 20 Tab 0   • hydroxychloroquine (PLAQUENIL) 200 MG Tab Take 200 mg by mouth every day.     • vitamin D (CHOLECALCIFEROL) 1000 UNIT Tab Take 1,000 Units by mouth every day.     • magnesium oxide (MAG-OX) 400 MG Tab Take 400 mg by mouth every day.     • pantoprazole (PROTONIX) 40 MG Tablet Delayed Response Take 1 Tab by mouth every day. Indications: Gastroesophageal Reflux Disease 30 Tab 0   • gabapentin (NEURONTIN) 300 MG Cap Take 300 mg by mouth 3 times a day.     • orphenadrine (NORFLEX) 100 MG tablet Take 1 Tab by mouth 2 times a day. 60 Tab 1   • famotidine (PEPCID) 40 MG Tab Take 1 Tab by mouth every day. 60 Tab 0   • hydrocodone-acetaminophen (NORCO) 5-325 MG Tab per tablet Take 1-2 Tabs by mouth every 6 hours as needed. 16 Tab 0     No current facility-administered medications for this visit.     She  has a past medical history of Sinusitis, chronic; Allergy; and Muscle spasm.    ROS   No chest pain, no shortness of breath, no abdominal pain and all other systems were reviewed and are negative.       Objective:     Blood pressure 122/78, pulse 96, temperature 36.6 °C (97.8 °F), resp. rate  "18, height 1.651 m (5' 5\"), weight 95.709 kg (211 lb), last menstrual period 07/09/2017, SpO2 98 %, unknown if currently breastfeeding. Body mass index is 35.11 kg/(m^2).   Physical Exam:  Constitutional: Alert, no distress.  Skin: Warm, dry, good turgor, no rashes in visible areas.  Eye: Equal, round and reactive, conjunctiva clear, lids normal.  ENMT: Lips without lesions, good dentition, oropharynx with bilateral tonsils swollen and erythematous white with exudate.  Neck: Trachea midline, no masses.   Lymph: No cervical or supraclavicular lymphadenopathy  Respiratory: Unlabored respiratory effort, lungs appear clear, no wheezes.  Cardiovascular: Regular rate and rhythm, no edema.  Psych: Alert and oriented x3, normal affect and mood.        Assessment and Plan:   The following treatment plan was discussed    1. Acute tonsillitis, unspecified etiology  Acute, new onset condition. We'll send off a throat culture. Strep test was negative. Provided prednisone 60 mg for one day. Given 3 day course if sore throat did not improve. Also provided amoxicillin prophylactically until throat culture returns. Likely she has overgrowth of normal mai.  - predniSONE (DELTASONE) 20 MG Tab; Take 3 tablets x 3 days.  Dispense: 30 Tab; Refill: 0  - amoxicillin (AMOXIL) 875 MG tablet; Take 1 Tab by mouth 2 times a day for 10 days.  Dispense: 20 Tab; Refill: 0  - CULTURE THROAT; Future  - POCT Rap id Strep A    2. Obesity (BMI 30-39.9)  Advised in the future to exercise if possible. Continue to eat healthy.  - Patient identified as having weight management issue.  Appropriate orders and counseling given.      Followup: No Follow-up on file.    Please note that this dictation was created using voice recognition software. I have made every reasonable attempt to correct obvious errors, but I expect that there are errors of grammar and possibly content that I did not discover before finalizing the note.             "

## 2017-07-31 NOTE — IP AVS SNAPSHOT
" Home Care Instructions                                                                                                                  Name:Nicole Ramirez  Medical Record Number:[id number]  CSN: [id number]    YOB: 1980   Age: 37 y.o.  Sex: female  HT:1.651 m (5' 5\") WT: 96 kg (211 lb 10.3 oz)          Admit Date: 7/31/2017     Discharge Date:   Today's Date: 8/1/2017  Attending Doctor:  Rob Hanson M.D.                  Allergies:  Morphine and Wellbutrin            Discharge Instructions       [facility]   Activity:no lifting weight greater than 20 lbs x 4 weeks   Diet:clear liquids tonight then advance as tolerated   Showers only x 2 weeks   No driving for one week or while on pain medications   Wound Care:remove tegaderms in 4 days     Discharge Instructions    Discharged to home by car with relative. Discharged via wheelchair, hospital escort: Yes.  Special equipment needed: Not Applicable    Be sure to schedule a follow-up appointment with your primary care doctor or any specialists as instructed.     Discharge Plan:   Influenza Vaccine Indication: Not indicated: Previously immunized this influenza season and > 8 years of age    I understand that a diet low in cholesterol, fat, and sodium is recommended for good health. Unless I have been given specific instructions below for another diet, I accept this instruction as my diet prescription.   Other diet: Clear liquids today/tonight. Advance as tolerated to a regular diet tomorrow.     Special Instructions: Laparoscopic Cholecystectomy, Care After  Refer to this sheet in the next few weeks. These instructions provide you with information on caring for yourself after your procedure. Your health care provider may also give you more specific instructions. Your treatment has been planned according to current medical practices, but problems sometimes occur. Call your health care provider if you have any problems or questions after your " procedure.  WHAT TO EXPECT AFTER THE PROCEDURE  After your procedure, it is typical to have the following:  · Pain at your incision sites. You will be given pain medicines to control the pain.  · Mild nausea or vomiting. This should improve after the first 24 hours.  · Bloating and possibly shoulder pain from the gas used during the procedure. This will improve after the first 24 hours.  HOME CARE INSTRUCTIONS   · Change bandages (dressings) as directed by your health care provider.  · Keep the wound dry and clean. You may wash the wound gently with soap and water. Gently blot or dab the area dry.  · Do not take baths or use swimming pools or hot tubs for 2 weeks or until your health care provider approves.  · Only take over-the-counter or prescription medicines as directed by your health care provider.  · Continue your normal diet as directed by your health care provider.  · Do not lift anything heavier than 10 pounds (4.5 kg) until your health care provider approves.  · Do not play contact sports for 1 week or until your health care provider approves.  SEEK MEDICAL CARE IF:   · You have redness, swelling, or increasing pain in the wound.  · You notice yellowish-white fluid (pus) coming from the wound.  · You have drainage from the wound that lasts longer than 1 day.  · You notice a bad smell coming from the wound or dressing.  · Your surgical cuts (incisions) break open.  SEEK IMMEDIATE MEDICAL CARE IF:   · You develop a rash.  · You have difficulty breathing.  · You have chest pain.  · You have a fever.  · You have increasing pain in the shoulders (shoulder strap areas).  · You have dizzy episodes or faint while standing.  · You have severe abdominal pain.  · You feel sick to your stomach (nauseous) or throw up (vomit) and this lasts for more than 1 day.     This information is not intended to replace advice given to you by your health care provider. Make sure you discuss any questions you have with your health  care provider.     Document Released: 12/18/2006 Document Revised: 10/08/2014 Document Reviewed: 07/30/2014  [employer] Interactive Patient Education ©2016 [employer]      · Is patient discharged on Warfarin / Coumadin?   No     · Is patient Post Blood Transfusion?  No    Depression / Suicide Risk    As you are discharged from this [facility] facility, it is important to learn how to keep safe from harming yourself.    Recognize the warning signs:  · Abrupt changes in personality, positive or negative- including increase in energy   · Giving away possessions  · Change in eating patterns- significant weight changes-  positive or negative  · Change in sleeping patterns- unable to sleep or sleeping all the time   · Unwillingness or inability to communicate  · Depression  · Unusual sadness, discouragement and loneliness  · Talk of wanting to die  · Neglect of personal appearance   · Rebelliousness- reckless behavior  · Withdrawal from people/activities they love  · Confusion- inability to concentrate     If you or a loved one observes any of these behaviors or has concerns about self-harm, here's what you can do:  · Talk about it- your feelings and reasons for harming yourself  · Remove any means that you might use to hurt yourself (examples: pills, rope, extension cords, firearm)  · Get professional help from the community (Mental Health, Substance Abuse, psychological counseling)  · Do not be alone:Call your Safe Contact- someone whom you trust who will be there for you.  · Call your local CRISIS HOTLINE 455-0960 or 940-957-9651  · Call your local Children's Mobile Crisis Response Team [place] (514) 883-1199 or www.cookdinner  · Call the toll free National Suicide Prevention Hotlines   · National Suicide Prevention Lifeline 015-814-KELQ (1140)  · National Hope Line Network 800-SUICIDE (288-4509)        Follow-up Information     1. Follow up with Rob Hanson M.D. In 1 week.    Specialties:  Surgery,  Radiology    Contact information    [address]  [address]  [place] [address]  [phone]           Discharge Medication Instructions:    Below are the medications your physician expects you to take upon discharge:    Review all your home medications and newly ordered medications with your doctor and/or pharmacist. Follow medication instructions as directed by your doctor and/or pharmacist.    Please keep your medication list with you and share with your physician.               Medication List      CONTINUE taking these medications        Instructions    Morning Afternoon Evening Bedtime    amoxicillin 875 MG tablet   Commonly known as:  AMOXIL        Take 1 Tab by mouth 2 times a day for 10 days.   Dose:  875 mg                        famotidine 40 MG Tabs   Commonly known as:  PEPCID        Take 1 Tab by mouth every day.   Dose:  40 mg                        gabapentin 300 MG Caps   Last time this was given:  300 mg on 8/1/2017  8:11 AM   Commonly known as:  NEURONTIN        Take 300 mg by mouth 3 times a day.   Dose:  300 mg                        hydrocodone-acetaminophen 5-325 MG Tabs per tablet   Commonly known as:  NORCO        Take 1-2 Tabs by mouth every 6 hours as needed.   Dose:  1-2 Tab                        hydroxychloroquine 200 MG Tabs   Commonly known as:  PLAQUENIL        Take 200 mg by mouth every day.   Dose:  200 mg                        magnesium oxide 400 MG Tabs   Commonly known as:  MAG-OX        Take 400 mg by mouth every day.   Dose:  400 mg                        orphenadrine 100 MG tablet   Commonly known as:  NORFLEX        Take 1 Tab by mouth 2 times a day.   Dose:  100 mg                        pantoprazole 40 MG Tbec   Commonly known as:  PROTONIX        Take 1 Tab by mouth every day. Indications: Gastroesophageal Reflux Disease   Dose:  40 mg                        predniSONE 20 MG Tabs   Last time this was given:  20 mg on 7/31/2017 10:55 PM   Commonly known as:  DELTASONE         Take 3 tablets x 3 days.                        vitamin D 1000 UNIT Tabs   Commonly known as:  cholecalciferol        Take 1,000 Units by mouth every day.   Dose:  1000 Units                                Instructions           Diet / Nutrition:    Follow any diet instructions given to you by your doctor or the dietician, including how much salt (sodium) you are allowed each day.    If you are overweight, talk to your doctor about a weight reduction plan.    Activity:    Remain physically active following your doctor's instructions about exercise and activity.    Rest often.     Any time you become even a little tired or short of breath, SIT DOWN and rest.    Worsening Symptoms:    Report any of the following signs and symptoms to the doctor's office immediately:    *Pain of jaw, arm, or neck  *Chest pain not relieved by medication                               *Dizziness or loss of consciousness  *Difficulty breathing even when at rest   *More tired than usual                                       *Bleeding drainage or swelling of surgical site  *Swelling of feet, ankles, legs or stomach                 *Fever (>100ºF)  *Pink or blood tinged sputum  *Weight gain (3lbs/day or 5lbs /week)           *Shock from internal defibrillator (if applicable)  *Palpitations or irregular heartbeats                *Cool and/or numb extremities    Stroke Awareness    Common Risk Factors for Stroke include:    Age  Atrial Fibrillation  Carotid Artery Stenosis  Diabetes Mellitus  Excessive alcohol consumption  High blood pressure  Overweight   Physical inactivity  Smoking    Warning signs and symptoms of a stroke include:    *Sudden numbness or weakness of the face, arm or leg (especially on one side of the body).  *Sudden confusion, trouble speaking or understanding.  *Sudden trouble seeing in one or both eyes.  *Sudden trouble walking, dizziness, loss of balance or coordination.Sudden severe headache with no known cause.    It is  very important to get treatment quickly when a stroke occurs. If you experience any of the above warning signs, call 911 immediately.                   Disclaimer         Quit Smoking / Tobacco Use:    I understand the use of any tobacco products increases my chance of suffering from future heart disease or stroke and could cause other illnesses which may shorten my life. Quitting the use of tobacco products is the single most important thing I can do to improve my health. For further information on smoking / tobacco cessation call a Toll Free Quit Line at 1-788.673.8374 (*National Cancer [employer]) or 1-202.869.5271 (American Lung Association) or you can access the web based program at www.lungusa.org.    [place] Tobacco Users Help Line:  (604) 897-4094       Toll Free: 1-619.479.3573  Quit Tobacco Program [facility] (797)587-5507    Crisis Hotline:    [place] Crisis Hotline:  7-316-VKLLBFH or 1-325.304.8742    [place] Crisis Hotline:    1-711.958.7387 or 034-433-8893    Discharge Survey:   Thank you for choosing [facility]. We hope we did everything we could to make your hospital stay a pleasant one. You may be receiving a phone survey and we would appreciate your time and participation in answering the questions. Your input is very valuable to us in our efforts to improve our service to our patients and their families.        My signature on this form indicates that:    1. I have reviewed and understand the above information.  2. My questions regarding this information have been answered to my satisfaction.  3. I have formulated a plan with my discharge nurse to obtain my prescribed medications for home.                  Disclaimer         __________________________________                     __________       ________                       Patient Signature                                                 Date                    Time

## 2017-08-01 VITALS
BODY MASS INDEX: 35.26 KG/M2 | HEART RATE: 81 BPM | DIASTOLIC BLOOD PRESSURE: 77 MMHG | SYSTOLIC BLOOD PRESSURE: 116 MMHG | WEIGHT: 211.64 LBS | HEIGHT: 65 IN | RESPIRATION RATE: 16 BRPM | TEMPERATURE: 97.9 F | OXYGEN SATURATION: 99 %

## 2017-08-01 PROBLEM — K80.00 CHOLECYSTITIS, ACUTE WITH CHOLELITHIASIS: Status: RESOLVED | Noted: 2017-07-31 | Resolved: 2017-08-01

## 2017-08-01 LAB
ALBUMIN SERPL BCP-MCNC: 3.5 G/DL (ref 3.2–4.9)
ALP SERPL-CCNC: 68 U/L (ref 30–99)
ALT SERPL-CCNC: 35 U/L (ref 2–50)
AST SERPL-CCNC: 23 U/L (ref 12–45)
BILIRUB CONJ SERPL-MCNC: 0.1 MG/DL (ref 0.1–0.5)
BILIRUB INDIRECT SERPL-MCNC: 0.3 MG/DL (ref 0–1)
BILIRUB SERPL-MCNC: 0.4 MG/DL (ref 0.1–1.5)
LIPASE SERPL-CCNC: 33 U/L (ref 7–58)
PATHOLOGY CONSULT NOTE: NORMAL
PROT SERPL-MCNC: 6.9 G/DL (ref 6–8.2)

## 2017-08-01 PROCEDURE — 36415 COLL VENOUS BLD VENIPUNCTURE: CPT

## 2017-08-01 PROCEDURE — 501583 HCHG TROCAR, THRD CAN&SEAL 5X100: Performed by: SURGERY

## 2017-08-01 PROCEDURE — 160035 HCHG PACU - 1ST 60 MINS PHASE I: Performed by: SURGERY

## 2017-08-01 PROCEDURE — 83690 ASSAY OF LIPASE: CPT

## 2017-08-01 PROCEDURE — 160048 HCHG OR STATISTICAL LEVEL 1-5: Performed by: SURGERY

## 2017-08-01 PROCEDURE — 160028 HCHG SURGERY MINUTES - 1ST 30 MINS LEVEL 3: Performed by: SURGERY

## 2017-08-01 PROCEDURE — 700102 HCHG RX REV CODE 250 W/ 637 OVERRIDE(OP): Performed by: SURGERY

## 2017-08-01 PROCEDURE — 700101 HCHG RX REV CODE 250: Performed by: SURGERY

## 2017-08-01 PROCEDURE — 700105 HCHG RX REV CODE 258

## 2017-08-01 PROCEDURE — 160002 HCHG RECOVERY MINUTES (STAT): Performed by: SURGERY

## 2017-08-01 PROCEDURE — 160039 HCHG SURGERY MINUTES - EA ADDL 1 MIN LEVEL 3: Performed by: SURGERY

## 2017-08-01 PROCEDURE — 502571 HCHG PACK, LAP CHOLE: Performed by: SURGERY

## 2017-08-01 PROCEDURE — 80076 HEPATIC FUNCTION PANEL: CPT

## 2017-08-01 PROCEDURE — 700111 HCHG RX REV CODE 636 W/ 250 OVERRIDE (IP): Performed by: SURGERY

## 2017-08-01 PROCEDURE — A9270 NON-COVERED ITEM OR SERVICE: HCPCS | Performed by: SURGERY

## 2017-08-01 PROCEDURE — 0FT44ZZ RESECTION OF GALLBLADDER, PERCUTANEOUS ENDOSCOPIC APPROACH: ICD-10-PCS | Performed by: SURGERY

## 2017-08-01 PROCEDURE — 700102 HCHG RX REV CODE 250 W/ 637 OVERRIDE(OP)

## 2017-08-01 PROCEDURE — 501584 HCHG TROCAR, THRD CAN&SEAL11X100: Performed by: SURGERY

## 2017-08-01 PROCEDURE — 500800 HCHG LAPAROSCOPIC J/L HOOK: Performed by: SURGERY

## 2017-08-01 PROCEDURE — 700105 HCHG RX REV CODE 258: Performed by: SURGERY

## 2017-08-01 PROCEDURE — 501399 HCHG SPECIMAN BAG, ENDO CATC: Performed by: SURGERY

## 2017-08-01 PROCEDURE — 160009 HCHG ANES TIME/MIN: Performed by: SURGERY

## 2017-08-01 PROCEDURE — A9270 NON-COVERED ITEM OR SERVICE: HCPCS

## 2017-08-01 PROCEDURE — 160036 HCHG PACU - EA ADDL 30 MINS PHASE I: Performed by: SURGERY

## 2017-08-01 PROCEDURE — A6402 STERILE GAUZE <= 16 SQ IN: HCPCS | Performed by: SURGERY

## 2017-08-01 PROCEDURE — 88304 TISSUE EXAM BY PATHOLOGIST: CPT

## 2017-08-01 PROCEDURE — 700101 HCHG RX REV CODE 250

## 2017-08-01 PROCEDURE — 700111 HCHG RX REV CODE 636 W/ 250 OVERRIDE (IP)

## 2017-08-01 PROCEDURE — 501572 HCHG TROCAR, SHIELD OBTU 5X100: Performed by: SURGERY

## 2017-08-01 PROCEDURE — 501838 HCHG SUTURE GENERAL: Performed by: SURGERY

## 2017-08-01 PROCEDURE — 500697 HCHG HEMOCLIP, LARGE (ORANGE): Performed by: SURGERY

## 2017-08-01 RX ORDER — DIPHENHYDRAMINE HCL 25 MG
TABLET ORAL
Status: COMPLETED
Start: 2017-08-01 | End: 2017-08-01

## 2017-08-01 RX ORDER — BUPIVACAINE HYDROCHLORIDE AND EPINEPHRINE 5; 5 MG/ML; UG/ML
INJECTION, SOLUTION PERINEURAL
Status: DISCONTINUED | OUTPATIENT
Start: 2017-08-01 | End: 2017-08-01 | Stop reason: HOSPADM

## 2017-08-01 RX ORDER — DIPHENHYDRAMINE HYDROCHLORIDE 50 MG/ML
INJECTION INTRAMUSCULAR; INTRAVENOUS
Status: COMPLETED
Start: 2017-08-01 | End: 2017-08-01

## 2017-08-01 RX ORDER — SODIUM CHLORIDE, SODIUM LACTATE, POTASSIUM CHLORIDE, CALCIUM CHLORIDE 600; 310; 30; 20 MG/100ML; MG/100ML; MG/100ML; MG/100ML
1000 INJECTION, SOLUTION INTRAVENOUS
Status: DISCONTINUED | OUTPATIENT
Start: 2017-08-01 | End: 2017-08-01 | Stop reason: HOSPADM

## 2017-08-01 RX ADMIN — HYDROMORPHONE HYDROCHLORIDE 0.2 MG: 1 INJECTION, SOLUTION INTRAMUSCULAR; INTRAVENOUS; SUBCUTANEOUS at 17:50

## 2017-08-01 RX ADMIN — HYDROMORPHONE HYDROCHLORIDE 1 MG: 1 INJECTION, SOLUTION INTRAMUSCULAR; INTRAVENOUS; SUBCUTANEOUS at 05:23

## 2017-08-01 RX ADMIN — HYDROMORPHONE HYDROCHLORIDE 0.2 MG: 1 INJECTION, SOLUTION INTRAMUSCULAR; INTRAVENOUS; SUBCUTANEOUS at 17:40

## 2017-08-01 RX ADMIN — FAMOTIDINE 20 MG: 10 INJECTION, SOLUTION INTRAVENOUS at 19:48

## 2017-08-01 RX ADMIN — FENTANYL CITRATE 50 MCG: 50 INJECTION, SOLUTION INTRAMUSCULAR; INTRAVENOUS at 17:13

## 2017-08-01 RX ADMIN — HYDROMORPHONE HYDROCHLORIDE 1 MG: 1 INJECTION, SOLUTION INTRAMUSCULAR; INTRAVENOUS; SUBCUTANEOUS at 07:38

## 2017-08-01 RX ADMIN — GABAPENTIN 300 MG: 300 CAPSULE ORAL at 08:11

## 2017-08-01 RX ADMIN — HYDROMORPHONE HYDROCHLORIDE 1 MG: 1 INJECTION, SOLUTION INTRAMUSCULAR; INTRAVENOUS; SUBCUTANEOUS at 09:48

## 2017-08-01 RX ADMIN — HYDROMORPHONE HYDROCHLORIDE 1 MG: 1 INJECTION, SOLUTION INTRAMUSCULAR; INTRAVENOUS; SUBCUTANEOUS at 14:26

## 2017-08-01 RX ADMIN — FENTANYL CITRATE 50 MCG: 50 INJECTION, SOLUTION INTRAMUSCULAR; INTRAVENOUS at 17:20

## 2017-08-01 RX ADMIN — SODIUM CHLORIDE, SODIUM LACTATE, POTASSIUM CHLORIDE, CALCIUM CHLORIDE 1000 ML: 600; 310; 30; 20 INJECTION, SOLUTION INTRAVENOUS at 15:36

## 2017-08-01 RX ADMIN — POTASSIUM CHLORIDE, DEXTROSE MONOHYDRATE AND SODIUM CHLORIDE: 150; 5; 900 INJECTION, SOLUTION INTRAVENOUS at 13:06

## 2017-08-01 RX ADMIN — HYDROMORPHONE HYDROCHLORIDE 1 MG: 1 INJECTION, SOLUTION INTRAMUSCULAR; INTRAVENOUS; SUBCUTANEOUS at 00:00

## 2017-08-01 RX ADMIN — CEFOTETAN DISODIUM 1 G: 1 INJECTION, POWDER, FOR SOLUTION INTRAMUSCULAR; INTRAVENOUS at 08:11

## 2017-08-01 RX ADMIN — HYDROMORPHONE HYDROCHLORIDE 0.2 MG: 1 INJECTION, SOLUTION INTRAMUSCULAR; INTRAVENOUS; SUBCUTANEOUS at 17:45

## 2017-08-01 RX ADMIN — FAMOTIDINE 20 MG: 10 INJECTION, SOLUTION INTRAVENOUS at 07:39

## 2017-08-01 RX ADMIN — HYDROMORPHONE HYDROCHLORIDE 1 MG: 1 INJECTION, SOLUTION INTRAMUSCULAR; INTRAVENOUS; SUBCUTANEOUS at 12:23

## 2017-08-01 RX ADMIN — HYDROMORPHONE HYDROCHLORIDE 1 MG: 1 INJECTION, SOLUTION INTRAMUSCULAR; INTRAVENOUS; SUBCUTANEOUS at 02:51

## 2017-08-01 ASSESSMENT — ENCOUNTER SYMPTOMS
RESPIRATORY NEGATIVE: 1
NAUSEA: 0
CARDIOVASCULAR NEGATIVE: 1
VOMITING: 0

## 2017-08-01 ASSESSMENT — PAIN SCALES - GENERAL
PAINLEVEL_OUTOF10: 6
PAINLEVEL_OUTOF10: 7
PAINLEVEL_OUTOF10: 7
PAINLEVEL_OUTOF10: 8
PAINLEVEL_OUTOF10: 5
PAINLEVEL_OUTOF10: 2
PAINLEVEL_OUTOF10: 8
PAINLEVEL_OUTOF10: 9
PAINLEVEL_OUTOF10: 8
PAINLEVEL_OUTOF10: 9
PAINLEVEL_OUTOF10: 10
PAINLEVEL_OUTOF10: 5
PAINLEVEL_OUTOF10: 10

## 2017-08-01 ASSESSMENT — PATIENT HEALTH QUESTIONNAIRE - PHQ9
SUM OF ALL RESPONSES TO PHQ9 QUESTIONS 1 AND 2: 0
SUM OF ALL RESPONSES TO PHQ QUESTIONS 1-9: 0
2. FEELING DOWN, DEPRESSED, IRRITABLE, OR HOPELESS: NOT AT ALL
1. LITTLE INTEREST OR PLEASURE IN DOING THINGS: NOT AT ALL

## 2017-08-01 NOTE — H&P
DATE OF SERVICE:  07/31/2017    HISTORY OF PRESENT ILLNESS:  The patient is a 37-year-old female who presents   with 3-day history of epigastric and right upper quadrant pain.  She has been   seen several times by her primary care physician and the ER.  She comes back   tonight because the pain has exacerbated and is now intolerable.  She denies   any vomiting.  Does have mild nausea, feels bloated.  She was evaluated this   evening with an ultrasound that shows multiple stones within the gallbladder,   no gallbladder wall thickening, cholecystic fluid or ductal dilatation.    However, she does complain of a Galdamez sign during the examination.  She   states that food does exacerbate this.  I was asked to see the patient for   evaluation.    ALLERGIES:  MORPHINE CAUSES TO FEEL SHORT OF BREATH AND LIKE AN ELEPHANT   SITTING ON HER CHEST.  WELLBUTRIN CAUSES RAPID HEART RATE.  SHE ALSO THINKS   SHE MAY HAVE ADVERSE REACTION TO VICRYL SUTURES WHEN SHE HAD HER PREVIOUS   SURGERY, HAD ERYTHEMA DEVELOPED AROUND THE SUTURE LINES.    MEDICATIONS ON ADMISSION:  Home medication, she was taking gabapentin,   Plaquenil 200 mg tablets, magnesium oxide, Norflex 100 mg tablets, Protonix,   prednisone 20 mg tablets 3 times a day, and vitamin D.    SOCIAL HISTORY:  She works at the [employer].  She quit smoking in the past.  She does   not take drugs and rarely drinks alcohol.    PAST MEDICAL HISTORY:  Unknown type of connective tissue disorder and   fibromyalgia.    PAST SURGICAL HISTORY:  Breast reduction and C-sections x2.    REVIEW OF SYSTEMS:  Patient denies any neurological problems.  No   cardiopulmonary problems.  GI, history of nausea, bloating, and right upper   quadrant and epigastric pain for the past 3 days.  No urinary tract symptoms.    Musculoskeletal, chronic pain and fatigue.  No endocrine or psychiatric   disorders.    PHYSICAL EXAMINATION:  VITAL SIGNS:  Patient's temperature is 36.8, heart rate of 90, respirations   18,  blood pressure 114/77, weight 96 kilos.  HEAD AND NECK:  Pupils equal, round and react to light.  Extraocular movements   are intact.  No scleral icterus.  No cervical adenopathy.  LUNGS:  Clear bilaterally without wheezes, rales or rhonchi.  Normal chest   wall expansion.  HEART:  Regular rate and rhythm without murmurs, rubs or gallops.  No carotid   bruits or jugular venous distention.  ABDOMEN:  Soft, but tender in the epigastric and especially the right upper   quadrant subcostally over the gallbladder.  She has hypoactive bowel sounds.    No palpable masses.  MUSCULOSKELETAL:  Moves all 4 extremities in normal range of motion.  Strength   grossly normal.  NEUROLOGICAL:  Cranial nerves II through XII are grossly intact.  Sensation   grossly normal.  PSYCHIATRY:  Alert and oriented x3.  Mood and affect are appropriate.    LABORATORY VALUES:  Show sodium 137, potassium of 4.0, chloride of 105, bicarb   of 19, glucose of 91, BUN of 8, creatinine of 0.84, AST of 30, ALT of 43,   total bilirubin of 0.6, alkaline phosphatase of 75, lipase of 30.  Pregnancy   test negative.  White blood cell count of 11.1, hemoglobin and hematocrit 13.7   and 39.0, 233,000 platelets with a left shift, but no bands.  She has a hazy   urine specimen with some bacterial contamination, did not appear to be a good   clean catch.  Ultrasound shows cholelithiasis with no gallbladder wall   thickening, but positive Galdaemz sign.    IMPRESSION:  1.  Acute cholecystitis with biliary colic and cholelithiasis.  2.  Connective tissue disorder.  3.  Fibromyalgia.  4.  Obesity.    PLAN:  Patient will be admitted and kept n.p.o., given IV fluids and   antibiotics and scheduled for a laparoscopic cholecystectomy tomorrow.  I have   explained the risks and benefits of surgery to her including bleeding,   infection, injury to the bowel, common bile duct or postoperative bile leaks,   and conversion to open cholecystectomy.  She understands these risks  and   agreed to proceed.       ____________________________________     MD HERLINDA Atkins    DD:  07/31/2017 22:25:18  DT:  07/31/2017 22:50:02    D#:  [id number]  Job#:  [id number]

## 2017-08-01 NOTE — ED PROVIDER NOTES
"ED Provider Note    CHIEF COMPLAINT  Chief Complaint   Patient presents with   • Epigastric Pain       HPI  Nicole Ramirez is a 37 y.o. female who presents with a chief complaint of abdominal pain. She states is in the epigastric area. Duration has been for 2 days is getting gradually worse. Now she is having exacerbations of pain and waves of pain. This started this afternoon. She states she had some pain yesterday that resolved. She states chills has \"pus on my throat\". She's had several drops of the diarrhea more than 5. No exposure to C. Diff. And there is no vomiting. Apparently had fever a few days ago.    Patient states that she had abdominal pain which started before 5 days ago. Intermittent she is currently on medications she is a nurse at the [employer]. She denies any recent NSAID use. She states that she does not have a diagnosis of gastric ulcer has not been scoped. She's recently been diagnosed with fibromyalgia. Also connected tissue disorder of unknown significance. According to her friend she does not like narcotics.        REVIEW OF SYSTEMS  General: No fever or chills.  Eyes: No eye discharge. No eye pain.  Ear nose throat: No sore throat or  trouble swallowing.  Pulmonary: No shortness of breath or cough.  Cardiovascular: No chest pain or chest pressure.  GI: Positive abdominal pain  : No dysuria or hematuria  Dermatologic: No rashes. No abrasions.  Neurologic: No weakness or numbness.    All other systems are negative      PAST MEDICAL HISTORY  Past Medical History   Diagnosis Date   • Sinusitis, chronic    • Allergy    • Muscle spasm        FAMILY HISTORY  Family History   Problem Relation Age of Onset   • Cancer Father      skin   • Diabetes Neg Hx    • Cancer Paternal Grandmother      skin   • Cancer Maternal Grandmother      bowel   • Heart Disease Mother    • Heart Disease Maternal Grandmother    • Heart Disease Maternal Aunt    • Heart Disease Maternal Uncle    • " Hypertension Mother    • Hypertension Maternal Grandmother    • Hypertension Maternal Aunt    • Hypertension Maternal Uncle    • Heart Disease Maternal Grandfather    • Hyperlipidemia Mother    • Psychiatry Neg Hx    • Stroke Maternal Aunt    • Thyroid Neg Hx        SOCIAL HISTORY  Social History     Social History   • Marital Status: Single     Spouse Name: N/A   • Number of Children: N/A   • Years of Education: N/A     Social History Main Topics   • Smoking status: Former Smoker -- 14 years     Types: Cigarettes     Quit date: 06/03/2008   • Smokeless tobacco: Never Used   • Alcohol Use: No   • Drug Use: No   • Sexual Activity:     Partners: Female     Other Topics Concern   • None     Social History Narrative    Lives with 2 children, mother and best friend.     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 (Registered Nurse) on 07/31/17 at 1909  Med List Status: Complete    Medication Last Dose Status    amoxicillin (AMOXIL) 875 MG tablet  Active    famotidine (PEPCID) 40 MG Tab havent started Active    gabapentin (NEURONTIN) 300 MG Cap 7/31/2017 Active    hydrocodone-acetaminophen (NORCO) 5-325 MG Tab per tablet havent started Active    hydroxychloroquine (PLAQUENIL) 200 MG Tab 7/31/2017 Active    magnesium oxide (MAG-OX) 400 MG Tab 7/31/2017 Active    orphenadrine (NORFLEX) 100 MG tablet 7/31/2017 Active    pantoprazole (PROTONIX) 40 MG Tablet Delayed Response 7/31/2017 Active    predniSONE (DELTASONE) 20 MG Tab  Active    vitamin D (CHOLECALCIFEROL) 1000 UNIT Tab 7/31/2017 Active                 ALLERGIES  Allergies   Allergen Reactions   • Morphine Shortness of Breath   • Wellbutrin [Bupropion]      Rapid HR       PHYSICAL EXAM  VITAL SIGNS: /70 mmHg  Pulse 85  Temp(Src)  "36.8 °C (98.2 °F)  Resp 18  Ht 1.651 m (5' 5\")  Wt 96 kg (211 lb 10.3 oz)  BMI 35.22 kg/m2  SpO2 98%  LMP 07/09/2017  Constitutional: Well developed, Well nourished, No acute distress, Non-toxic appearance.   HENT: Normocephalic, Atraumatic, Bilateral external ears normal, Oropharynx moist, No oral exudates, Nose normal.   Eyes: PERRLA, EOMI, Conjunctiva normal, No discharge.   Musculoskeletal: Neck has normal range of motion, No tenderness, Supple.   Lymphatic: No cervical lymphadenopathy noted.   Cardiovascular: Normal heart rate, Normal rhythm, No murmurs, No rubs, No gallops.   Thorax & Lungs: Normal breath sounds, No respiratory distress, No wheezing, No chest tenderness.   Abdomen: Tenderness in the right upper quadrant with guarding epigastric. All other quadrants are soft.  Skin: Warm, Dry, No erythema, No rash.   : No CVA tenderness.   Psychiatric: Calm, not anxious  Neurologic: Alert & oriented, moves all extremities equally    RADIOLOGY/PROCEDURES  US-GALLBLADDER   Final Result         1. Cholelithiasis. No gallbladder wall thickening but there is sonographic Galdamez's sign. HIDA could be helpful to evaluate for acute cholecystitis.            Results for orders placed or performed during the hospital encounter of 07/31/17   CBC WITH DIFFERENTIAL   Result Value Ref Range    WBC 11.1 (H) 4.8 - 10.8 K/uL    RBC 4.49 4.20 - 5.40 M/uL    Hemoglobin 13.7 12.0 - 16.0 g/dL    Hematocrit 39.0 37.0 - 47.0 %    MCV 86.9 81.4 - 97.8 fL    MCH 30.5 27.0 - 33.0 pg    MCHC 35.1 (H) 33.6 - 35.0 g/dL    RDW 39.2 35.9 - 50.0 fL    Platelet Count 233 164 - 446 K/uL    MPV 10.6 9.0 - 12.9 fL    Neutrophils-Polys 87.80 (H) 44.00 - 72.00 %    Lymphocytes 8.50 (L) 22.00 - 41.00 %    Monocytes 2.80 0.00 - 13.40 %    Eosinophils 0.20 0.00 - 6.90 %    Basophils 0.30 0.00 - 1.80 %    Immature Granulocytes 0.40 0.00 - 0.90 %    Nucleated RBC 0.00 /100 WBC    Neutrophils (Absolute) 9.72 (H) 2.00 - 7.15 K/uL    Lymphs " (Absolute) 0.94 (L) 1.00 - 4.80 K/uL    Monos (Absolute) 0.31 0.00 - 0.85 K/uL    Eos (Absolute) 0.02 0.00 - 0.51 K/uL    Baso (Absolute) 0.03 0.00 - 0.12 K/uL    Immature Granulocytes (abs) 0.04 0.00 - 0.11 K/uL    NRBC (Absolute) 0.00 K/uL   COMP METABOLIC PANEL   Result Value Ref Range    Sodium 137 135 - 145 mmol/L    Potassium 4.0 3.6 - 5.5 mmol/L    Chloride 105 96 - 112 mmol/L    Co2 19 (L) 20 - 33 mmol/L    Anion Gap 13.0 (H) 0.0 - 11.9    Glucose 91 65 - 99 mg/dL    Bun 8 8 - 22 mg/dL    Creatinine 0.84 0.50 - 1.40 mg/dL    Calcium 8.7 8.4 - 10.2 mg/dL    AST(SGOT) 30 12 - 45 U/L    ALT(SGPT) 43 2 - 50 U/L    Alkaline Phosphatase 75 30 - 99 U/L    Total Bilirubin 0.6 0.1 - 1.5 mg/dL    Albumin 3.8 3.2 - 4.9 g/dL    Total Protein 7.5 6.0 - 8.2 g/dL    Globulin 3.7 (H) 1.9 - 3.5 g/dL    A-G Ratio 1.0 g/dL   LIPASE   Result Value Ref Range    Lipase 30 7 - 58 U/L   HCG QUAL SERUM   Result Value Ref Range    Beta-Hcg Qualitative Serum Negative Negative   ESTIMATED GFR   Result Value Ref Range    GFR If African American >60 >60 mL/min/1.73 m 2    GFR If Non African American >60 >60 mL/min/1.73 m 2        COURSE & MEDICAL DECISION MAKING  Pertinent Labs & Imaging studies reviewed. (See chart for details)  Patient is here after 3 other visits. Reading the charts patient was seen had a rapid strep is negative sent home and she comes back next day to the ER that is with abdominal pain relieved with GI cocktail for about 20 minutes and returned back with worsening pain went to the clinic again another rapid strep is negative and ankles here with abdominal pain that occurred about 3-4 hours later. She itches and has right upper quadrant tenderness and guarding.    At this point patient may be getting cholecystitis. The patient may have gallstones biliary colic gastric ulcer disease among others.    Patient received 1.5 mill grams Dilaudid Zofran feels much better. Exam consistent with right upper quadrant tenderness.  She has a white cell count is of a shift. She is not pregnant negative lipase.    I've spoken to the surgeon at approximately 940 who will see the patient and decide if we admitted for medical reasons or perhaps surgical or perhaps patient be discharged home patient this point was told of the results. Details discussed the patient  FINAL IMPRESSION  1. Right upper quadrant pain  2.   3.      Electronically signed by: Antoine Grier, 7/31/2017 7:50 PM

## 2017-08-01 NOTE — DISCHARGE PLANNING
Patient was discussed in morning rounds.  Patient reportedly lives at home with her spouse and plans to return home when medically able. No current SS needs noted.

## 2017-08-01 NOTE — ED NOTES
Pt scheduled for surgery tomorrow. Pain has returned, pt yelling out. Verbal order placed by Dr. PABON

## 2017-08-01 NOTE — PROGRESS NOTES
Pt arrived to unit from ER w/ hospital personnel via WC. Pt A&Ox4, VSS w/ moderate c/o pain to epigastric region, stabbing in nature.Discussed medication schedule with pt and when she will be due for her next dose. Discussed POC; medications and tests as well as OR in AM for lap eriln as well as NPO status. Pt verbalized understanding. Fall precautions in place; call light within reach; bed locked and in lowest position; hourly rounding. Mouth swabs provided for added comfort. IVF infusing. Pt is up self to BR w/ no assistive devices and tolerates well. Admit profile complete; no further needs at this time. Pt denies C/P, SOB, dizziness, lightheadedness, nausea/vomiting, numbness/tingling.

## 2017-08-01 NOTE — PROGRESS NOTES
Surgical Progress Note    Author: Rob Hanson Date & Time created: 2017   7:03 AM     Interval Events:    Review of Systems   Respiratory: Negative.    Cardiovascular: Negative.    Gastrointestinal: Negative for nausea and vomiting.        Right subcostal pain improved but still there     Hemodynamics:  Temp (24hrs), Av.6 °C (97.8 °F), Min:36.3 °C (97.3 °F), Max:36.8 °C (98.2 °F)  Temperature: 36.3 °C (97.3 °F)  Pulse  Av  Min: 72  Max: 96   Blood Pressure: 101/62 mmHg     Respiratory:    Respiration: 18, Pulse Oximetry: 100 %           Neuro:  GCS       Fluids:    Intake/Output Summary (Last 24 hours) at 17 0703  Last data filed at 17 0700   Gross per 24 hour   Intake   1200 ml   Output    800 ml   Net    400 ml     Weight: 96 kg (211 lb 10.3 oz)  Current Diet Order   Procedures   • DIET NPO     Physical Exam   Cardiovascular: Normal rate, regular rhythm and normal heart sounds.    Pulmonary/Chest: Effort normal and breath sounds normal.   Abdominal: Soft. She exhibits no distension.   Minimal tenderness to palpation over the right subcostal area. No mass     Labs:  Recent Results (from the past 24 hour(s))   POCT Rapid Strep A    Collection Time: 17 10:23 AM   Result Value Ref Range    Rapid Strep Screen Neg     Internal Control Positive Positive     Internal Control Negative Negative    CBC WITH DIFFERENTIAL    Collection Time: 17  7:56 PM   Result Value Ref Range    WBC 11.1 (H) 4.8 - 10.8 K/uL    RBC 4.49 4.20 - 5.40 M/uL    Hemoglobin 13.7 12.0 - 16.0 g/dL    Hematocrit 39.0 37.0 - 47.0 %    MCV 86.9 81.4 - 97.8 fL    MCH 30.5 27.0 - 33.0 pg    MCHC 35.1 (H) 33.6 - 35.0 g/dL    RDW 39.2 35.9 - 50.0 fL    Platelet Count 233 164 - 446 K/uL    MPV 10.6 9.0 - 12.9 fL    Neutrophils-Polys 87.80 (H) 44.00 - 72.00 %    Lymphocytes 8.50 (L) 22.00 - 41.00 %    Monocytes 2.80 0.00 - 13.40 %    Eosinophils 0.20 0.00 - 6.90 %    Basophils 0.30 0.00 - 1.80 %    Immature Granulocytes  0.40 0.00 - 0.90 %    Nucleated RBC 0.00 /100 WBC    Neutrophils (Absolute) 9.72 (H) 2.00 - 7.15 K/uL    Lymphs (Absolute) 0.94 (L) 1.00 - 4.80 K/uL    Monos (Absolute) 0.31 0.00 - 0.85 K/uL    Eos (Absolute) 0.02 0.00 - 0.51 K/uL    Baso (Absolute) 0.03 0.00 - 0.12 K/uL    Immature Granulocytes (abs) 0.04 0.00 - 0.11 K/uL    NRBC (Absolute) 0.00 K/uL   COMP METABOLIC PANEL    Collection Time: 07/31/17  7:56 PM   Result Value Ref Range    Sodium 137 135 - 145 mmol/L    Potassium 4.0 3.6 - 5.5 mmol/L    Chloride 105 96 - 112 mmol/L    Co2 19 (L) 20 - 33 mmol/L    Anion Gap 13.0 (H) 0.0 - 11.9    Glucose 91 65 - 99 mg/dL    Bun 8 8 - 22 mg/dL    Creatinine 0.84 0.50 - 1.40 mg/dL    Calcium 8.7 8.4 - 10.2 mg/dL    AST(SGOT) 30 12 - 45 U/L    ALT(SGPT) 43 2 - 50 U/L    Alkaline Phosphatase 75 30 - 99 U/L    Total Bilirubin 0.6 0.1 - 1.5 mg/dL    Albumin 3.8 3.2 - 4.9 g/dL    Total Protein 7.5 6.0 - 8.2 g/dL    Globulin 3.7 (H) 1.9 - 3.5 g/dL    A-G Ratio 1.0 g/dL   LIPASE    Collection Time: 07/31/17  7:56 PM   Result Value Ref Range    Lipase 30 7 - 58 U/L   HCG QUAL SERUM    Collection Time: 07/31/17  7:56 PM   Result Value Ref Range    Beta-Hcg Qualitative Serum Negative Negative   ESTIMATED GFR    Collection Time: 07/31/17  7:56 PM   Result Value Ref Range    GFR If African American >60 >60 mL/min/1.73 m 2    GFR If Non African American >60 >60 mL/min/1.73 m 2   URINALYSIS CULTURE, IF INDICATED    Collection Time: 07/31/17  9:33 PM   Result Value Ref Range    Micro Urine Req Microscopic     Color Yellow     Character Hazy (A)     Specific Gravity 1.025 <1.035    Ph 8.0 5.0-8.0    Glucose Negative Negative mg/dL    Ketones >=80 (A) Negative mg/dL    Protein Negative Negative mg/dL    Bilirubin Small (A) Negative    Nitrite Negative Negative    Leukocyte Esterase Negative Negative    Occult Blood Trace (A) Negative    Culture Indicated Yes UA Culture   URINE MICROSCOPIC (W/UA)    Collection Time: 07/31/17  9:33 PM    Result Value Ref Range    WBC 2-5 /hpf    RBC 5-10 (A) /hpf    Bacteria Moderate (A) None /hpf    Epithelial Cells Moderate (A) Few /hpf    Mucous Threads Moderate /hpf    Urine Crystals Many Triple Jessica /hpf    Urine Crystals Few Ca Oxalate /hpf    Urine Crystals Mod Amorphous /hpf    Yeast Cells Few (A) None /hpf   Lipase    Collection Time: 08/01/17  5:25 AM   Result Value Ref Range    Lipase 33 7 - 58 U/L   HEPATIC FUNCTION PANEL    Collection Time: 08/01/17  5:25 AM   Result Value Ref Range    Alkaline Phosphatase 68 30 - 99 U/L    AST(SGOT) 23 12 - 45 U/L    ALT(SGPT) 35 2 - 50 U/L    Total Bilirubin 0.4 0.1 - 1.5 mg/dL    Direct Bilirubin 0.1 0.1 - 0.5 mg/dL    Indirect Bilirubin 0.3 0.0 - 1.0 mg/dL    Albumin 3.5 3.2 - 4.9 g/dL    Total Protein 6.9 6.0 - 8.2 g/dL     Medical Decision Making, by Problem:  Active Hospital Problems    Diagnosis   • Cholecystitis, acute with cholelithiasis [K80.00]     Plan:  For OR today, will schedule this AM for later today.     Quality Measures:    Milner catheter: No Milner      DVT Prophylaxis: Contraindicated - High bleeding risk  DVT prophylaxis - mechanical: SCDs and Foot Pumps  Ulcer prophylaxis: Yes  Antibiotics: Treating active infection/contamination beyond 24 hours perioperative coverage        Discussed patient condition with RN and patient

## 2017-08-01 NOTE — ED NOTES
Saline lock inserted with lab draw and sent to the lba. Medicated per order, NS infusng. Pt is aware that we need a UA.

## 2017-08-01 NOTE — CARE PLAN
Problem: Safety  Goal: Will remain free from falls  Outcome: PROGRESSING AS EXPECTED  Intervention: Implement fall precautions  Call light within reach; bed locked and in lowest position; fall precautions in place. Pt is up-self to the BR w/ no assistive devices and tolerates very well.       Problem: Knowledge Deficit  Goal: Knowledge of disease process/condition, treatment plan, diagnostic tests, and medications will improve  Outcome: PROGRESSING AS EXPECTED  Intervention: Explain information regarding disease process/condition, treatment plan, diagnostic tests, and medications and document in education  Discussed POC; OR in AM w/ Dr. Hanson for lap erlin as well as NPO status before surgery and pain control. Pt verbalizes understanding and agrees w/ POC.

## 2017-08-01 NOTE — DISCHARGE PLANNING
Care Transition Team Assessment    Information Source  Orientation : Oriented x 4  Information Given By: Patient  Informant's Name: Nicole  Who is responsible for making decisions for patient? : Patient    Readmission Evaluation  Is this a readmission?: No    Elopement Risk  Legal Hold: No  Ambulatory or Self Mobile in Wheelchair: Yes  Disoriented: No  Psychiatric Symptoms: None  History of Wandering: No  Elopement this Admit: No  Vocalizing Wanting to Leave: No  Displays Behaviors, Body Language Wanting to Leave: No-Not at Risk for Elopement  Elopement Risk: Not at Risk for Elopement    Interdisciplinary Discharge Planning  Does Admitting Nurse Feel This Could be a Complex Discharge?: No  Primary Care Physician: N/A  Lives with - Patient's Self Care Capacity: Child Less than 18 Years of Age, Spouse  Patient or legal guardian wants to designate a caregiver (see row info): No  Support Systems: Spouse / Significant Other, Children, Family Member(s)  Housing / Facility: [address]  Do You Take your Prescribed Medications Regularly: Yes  Able to Return to Previous ADL's: Yes  Mobility Issues: No  Prior Services: Home-Independent  Patient Expects to be Discharged to:: Home  Assistance Needed: No  Durable Medical Equipment: Not Applicable    Discharge Preparedness  What is your plan after discharge?: Home with help  What are your discharge supports?: Spouse         Finances  Financial Barriers to Discharge: No  Prescription Coverage: Yes    Vision / Hearing Impairment  Vision Impairment : No  Hearing Impairment : No    Values / Beliefs / Concerns  Values / Beliefs Concerns : No  Special Hospitalization Concerns: N/A    Advance Directive  Advance Directive?: None    Domestic Abuse  Have you ever been the victim of abuse or violence?: No  Physical Abuse or Sexual Abuse: No  Verbal Abuse or Emotional Abuse: No  Possible Abuse Reported to:: Not Applicable    Psychological Assessment  History of Substance Abuse:  None    Discharge Risks or Barriers  Discharge risks or barriers?: No    Anticipated Discharge Information  Anticipated discharge disposition: Home

## 2017-08-01 NOTE — OR NURSING
1704: To PACU from OR via bed, sleeping, respirations spontaneous and non-labored via ETT. Abdo soft, lap site dressings x 4 c/d/i  1710: Awakens and sat upright, extubated, denies pain, socks and extra bedclothes removed at pt request for comfort.  1713: commenced IV analgesia for onset abdo pain  1720:Pain persists, report to TOMA Low RN

## 2017-08-01 NOTE — PROGRESS NOTES
While receiving report, Dr. Hanson was at bedside and stated patient would be going to surgery at approximately 1500. This RN greeted patient and explained that her prn pain medication could be given now, at 0740, at which time the patient stated her pain level was at a 7/10. She also asked about her pepcid for her heart burn and I told her that I would also be bringing that to her along with her prn pain medication. Completed her assessment after administering her medications. Patient was able to get up and walk to the bathroom and back to bed.

## 2017-08-01 NOTE — ED NOTES
"Pt seen here yesterday for same complaint. Left with epigastric pain that was \" bearable\", increased since. Pt unable to tolerate \" even sips of water\" due to pain. Saw her PCP today around 1100 , instructed to start medication prescribed by ED. Took Protonix at 0630, pepcid and lidocaine at 1130 without relief.   "

## 2017-08-02 NOTE — CARE PLAN
Problem: Safety  Goal: Will remain free from injury  Patient ambulates safely from bed to bathroom and back, remaining free from injury.

## 2017-08-02 NOTE — OR NURSING
1720 - Report from JEAN CARLOS Harris. Patient drowsy. Pain tolerable at this time.  . Denies nausea. X 3 abdominal stab wounds with gauze dressing at umbilicus and dermabond on other 2 sites - all clean, dry, and intact. Abdomen soft.     1730 - Remains as above. VS as noted.    1740 -Pain 8/10 - Also C/ O right shoulder pain - repositioned with some improvement. medicated with Dilaudid as noted on MAR.     1745 -  Remains as above.Pain 8/10 - medicating with dilaudid as noted on MAR.  VS as noted.      1800 - Awake and cooperative. Pain now 5/10 and very tolerable per patient. Denies nausea - declines water at this time. VS as noted.     1815 - Awake and cooperative. Pain remains 5/10 and very tolerable. Denies nausea - declines water at this time. Dressings remain clean, dry, and intact. Abdomen soft. VS as noted. Meets criteria for transfer to room. Report called to JESSICA Dupree    1827 - Patient transferred via bed to room on O2 via NC at 2 lpm - by CNA.

## 2017-08-02 NOTE — PROGRESS NOTES
Pt a&ox4, family at bedside, VSS, denies nausea, C/P, dizziness, SOB, lightheadedness, numbness/tingling. Pt ambulated up to BR and hallways w/ no difficulty, up self. Pt tolerated 75% of clear liquid diet w/o nausea. Pt states pain is tolerable and is ready to go home. D/c orders in. Lap stabs to abd CDI.

## 2017-08-02 NOTE — PROGRESS NOTES
Pt was discharged with father of her children, Juan at 2004. Juan presented his 's licence at time of D/C.

## 2017-08-02 NOTE — OR SURGEON
Operative Report    PreOp Diagnosis: Cholecystitis and Cholithiasis    PostOp Diagnosis: Cholelithiasis and Biliary Colic    Procedure(s):  Laparoscopic Cholecystectomy - Wound Class: Clean Contaminated    Surgeon(s):  Rob Hanson M.D.    Anesthesiologist/Type of Anesthesia:GET  Anesthesiologist: Quoc Arzate M.D./General    Surgical Staff:  Assistant: MARI Ferrari  Circulator: Beulah Hammer R.N.  Scrub Person: Chandana Pichardo    Specimens:  Gallbladder    Estimated Blood Loss: none    Findings: no acute inflammation or wall thickening.    Complications: none        8/1/2017 5:23 PM Rob Hanson

## 2017-08-02 NOTE — DISCHARGE INSTRUCTIONS
Follow up: 7-10 days at [facility]   Activity:no lifting weight greater than 20 lbs x 4 weeks   Diet:clear liquids tonight then advance as tolerated   Showers only x 2 weeks   No driving for one week or while on pain medications   Wound Care:remove tegaderms in 4 days     Discharge Instructions    Discharged to home by car with relative. Discharged via wheelchair, hospital escort: Yes.  Special equipment needed: Not Applicable    Be sure to schedule a follow-up appointment with your primary care doctor or any specialists as instructed.     Discharge Plan:   Influenza Vaccine Indication: Not indicated: Previously immunized this influenza season and > 8 years of age    I understand that a diet low in cholesterol, fat, and sodium is recommended for good health. Unless I have been given specific instructions below for another diet, I accept this instruction as my diet prescription.   Other diet: Clear liquids today/tonight. Advance as tolerated to a regular diet tomorrow.     Special Instructions: Laparoscopic Cholecystectomy, Care After  Refer to this sheet in the next few weeks. These instructions provide you with information on caring for yourself after your procedure. Your health care provider may also give you more specific instructions. Your treatment has been planned according to current medical practices, but problems sometimes occur. Call your health care provider if you have any problems or questions after your procedure.  WHAT TO EXPECT AFTER THE PROCEDURE  After your procedure, it is typical to have the following:  · Pain at your incision sites. You will be given pain medicines to control the pain.  · Mild nausea or vomiting. This should improve after the first 24 hours.  · Bloating and possibly shoulder pain from the gas used during the procedure. This will improve after the first 24 hours.  HOME CARE INSTRUCTIONS   · Change bandages (dressings) as directed by your health care provider.  · Keep the wound dry and  clean. You may wash the wound gently with soap and water. Gently blot or dab the area dry.  · Do not take baths or use swimming pools or hot tubs for 2 weeks or until your health care provider approves.  · Only take over-the-counter or prescription medicines as directed by your health care provider.  · Continue your normal diet as directed by your health care provider.  · Do not lift anything heavier than 10 pounds (4.5 kg) until your health care provider approves.  · Do not play contact sports for 1 week or until your health care provider approves.  SEEK MEDICAL CARE IF:   · You have redness, swelling, or increasing pain in the wound.  · You notice yellowish-white fluid (pus) coming from the wound.  · You have drainage from the wound that lasts longer than 1 day.  · You notice a bad smell coming from the wound or dressing.  · Your surgical cuts (incisions) break open.  SEEK IMMEDIATE MEDICAL CARE IF:   · You develop a rash.  · You have difficulty breathing.  · You have chest pain.  · You have a fever.  · You have increasing pain in the shoulders (shoulder strap areas).  · You have dizzy episodes or faint while standing.  · You have severe abdominal pain.  · You feel sick to your stomach (nauseous) or throw up (vomit) and this lasts for more than 1 day.     This information is not intended to replace advice given to you by your health care provider. Make sure you discuss any questions you have with your health care provider.     Document Released: 12/18/2006 Document Revised: 10/08/2014 Document Reviewed: 07/30/2014  [employer] Interactive Patient Education ©2016 [employer]      · Is patient discharged on Warfarin / Coumadin?   No     · Is patient Post Blood Transfusion?  No    Depression / Suicide Risk    As you are discharged from this [facility] facility, it is important to learn how to keep safe from harming yourself.    Recognize the warning signs:  · Abrupt changes in personality, positive or negative-  including increase in energy   · Giving away possessions  · Change in eating patterns- significant weight changes-  positive or negative  · Change in sleeping patterns- unable to sleep or sleeping all the time   · Unwillingness or inability to communicate  · Depression  · Unusual sadness, discouragement and loneliness  · Talk of wanting to die  · Neglect of personal appearance   · Rebelliousness- reckless behavior  · Withdrawal from people/activities they love  · Confusion- inability to concentrate     If you or a loved one observes any of these behaviors or has concerns about self-harm, here's what you can do:  · Talk about it- your feelings and reasons for harming yourself  · Remove any means that you might use to hurt yourself (examples: pills, rope, extension cords, firearm)  · Get professional help from the community (Mental Health, Substance Abuse, psychological counseling)  · Do not be alone:Call your Safe Contact- someone whom you trust who will be there for you.  · Call your local CRISIS HOTLINE 653-9827 or 248-887-0247  · Call your local Children's Mobile Crisis Response Team [place] (948) 112-0666 or www.VoiceBunny  · Call the toll free National Suicide Prevention Hotlines   · National Suicide Prevention Lifeline 495-029-CSCL (1887)  · National Hope Line Network 800-SUICIDE (905-5851)

## 2017-08-02 NOTE — OP REPORT
DATE OF SERVICE:  08/01/2017    PREOPERATIVE DIAGNOSIS:  Acute cholecystitis with cholelithiasis.    POSTOPERATIVE DIAGNOSIS:  Cholelithiasis with biliary colic.    OPERATION PERFORMED:  Laparoscopic cholecystectomy.    ANESTHESIA:  General endotracheal by Dr. Arzate.    SURGEON:  Rob Hanson MD    FIRST ASSISTANT:  GISSELL Akers    INDICATIONS:  A 37-year-old female with a 4-day history of severe right upper   quadrant pain, not responding to medical management, was admitted for pain   control last night.    OPERATIVE FINDINGS:  No acute inflammation or wall thickening of the   gallbladder.  Stones were identified.    OPERATIVE NOTE:  The patient was taken to the operating room, placed in the   supine position, given general endotracheal anesthesia by Dr. Arzate.  Once   properly anesthetized, was prepped and draped in usual sterile fashion.    Marcaine 0.5% with epinephrine was used to anesthetize the skin.  A   supraumbilical transverse incision was made and carried down through the skin   and subcutaneous tissue.  Towel clamp was used to grab the umbilical stalk and   tented upward.  A small transverse incision was made through the linea alba   and blunt dissection into the abdominal cavity was carried out.  An 11 mm port   was then placed after confirmed that I was into the abdominal cavity and   there was no bowel.  Pneumoperitoneum was obtained.  General exploration was   carried out.  There was no apparent injury from open insertion technique.    Under direct vision, the 11 mm epigastric port and 2 lateral 5 mm ports were   placed after the skin had been anesthetized with 0.5% Marcaine with   epinephrine.  The gallbladder had adhesions to it and these were taken down   with electrocautery.  The gallbladder was grasped at its fundus and tented up   over the liver.  The peritoneal reflection was stripped down to expose the   cystic duct and artery.  These were clearly identified and their  relationship   to the right hepatic artery and common bile duct.  The cystic duct was clipped   proximally and distally with proximal end x3 and divided.  The artery was   clipped proximally and distally with proximal end of the artery was clipped   x2.  The gallbladder was brought off the liver bed with hook electrocautery.    There was some spillage of bile, but no stones.  The gallbladder was removed   off the liver bed and brought out in an EndoCatch bag, 2 liters of irrigation   was used to remove the spilled bile.  At the end of the procedure, the   irrigant was clear.  The liver bed was inspected.  Small bleeding points were   controlled with electrocautery.  At the completion of the procedure, there was   no active bleeding or bile leaks from the liver bed, duct, or artery.  Under   direct vision.  Because the patient had had previous problems with Vicryl and   0 Ethibond was used to close the epigastric port site and a figure-of-eight 0   Ethibond was used to close the umbilical port site.  Skin incisions were all   closed with 4-0 Monocryl.  Sterile Tegaderms were used for dressings.  Patient   tolerated the procedure well.  There were no apparent complications.  Lap,   sponge and instrument counts were correct.       ____________________________________     MD HERLINDA Atkins / GUME    DD:  08/01/2017 17:33:14  DT:  08/01/2017 18:04:23    D#:  [id number]  Job#:  [id number]

## 2017-08-02 NOTE — CARE PLAN
Problem: Pain Management  Goal: Pain level will decrease to patient’s comfort goal  Intervention: Follow pain managment plan developed in collaboration with patient and Interdisciplinary Team  Patient's pain remained controlled with prescribed pain medication dosage and frequency.

## 2017-08-02 NOTE — PROGRESS NOTES
Received patient back from PACU. She stated she would like to eat, walk, and go home as soon as possible. Explained to her the need for monitoring her vital signs and make sure she is stable and tolerating fluids, ambulation and the need to pass gas. Patient is cooperative and comfortable.

## 2017-08-02 NOTE — PROGRESS NOTES
D/c orders received. IV dc'd. Pt changed into clothing with assistance. Discharge instructions given; pt and family verbalized understanding and questions answered. Prescriptions with pt. Pt dc'd in w/c with hospital escort at 2004. Pt medicated with Pepcid before D/C.

## 2017-08-03 ENCOUNTER — TELEPHONE (OUTPATIENT)
Dept: MEDICAL GROUP | Facility: MEDICAL CENTER | Age: 37
End: 2017-08-03

## 2017-08-03 LAB
BACTERIA SPEC RESP CULT: NORMAL
BACTERIA UR CULT: NORMAL
SIGNIFICANT IND 70042: NORMAL
SIGNIFICANT IND 70042: NORMAL
SITE SITE: NORMAL
SOURCE SOURCE: NORMAL
SOURCE SOURCE: NORMAL

## 2017-08-03 NOTE — TELEPHONE ENCOUNTER
Phone Number Called: [phone] (home)       Message: Left msg for patient to call back.     Left Message for patient to call back: yes

## 2017-08-03 NOTE — DISCHARGE SUMMARY
DATE OF ADMISSION:  07/31/2017    DATE OF DISCHARGE:  08/01/2017    FINAL DISCHARGE DIAGNOSES:  1.  Biliary colic and cholelithiasis.  2.  Unspecific musculoskeletal and connective tissue disease.  3.  Fibromyalgia.    OPERATION PERFORMED:  Laparoscopic cholecystectomy .    HOSPITAL COURSE:  The patient is a 37-year-old female admitted with a 4-day   history of abdominal pain, felt to have symptomatic biliary colic and   cholelithiasis was taken to the operating room the following day after   admission and underwent a laparoscopic cholecystectomy.  Postoperatively, she   was able to tolerate liquids and her pain was controlled and was sent home   that evening.    FOLLOWUP:  She was to follow up in my office in 10-14 days.    DISCHARGE INSTRUCTIONS:  She is not to lift weight greater than 20 pounds for   4 weeks.  She is only to shower for the next 2 weeks.  She is not to drive for   at least a week.  She was given Percocet and Zofran for pain and nausea.       ____________________________________     MD HERLINDA Atkins / NTS    DD:  08/02/2017 07:51:26  DT:  08/02/2017 08:54:52    D#:  [id number]  Job#:  [id number]

## 2017-08-03 NOTE — TELEPHONE ENCOUNTER
----- Message from Antonio Pappas PA-C sent at 8/3/2017  1:18 PM PDT -----  Please contact Nicole.  Throat culture with moderate growth of usual bacteria.  No Strep or other serious bacterial infection.  Antibiotics may not have worked but time will.  Please contact me with concerns.  Thank you.    Antonio

## 2017-08-07 NOTE — TELEPHONE ENCOUNTER
Phone Number Called: [phone]      Message: Left message for patient about lab results.     Left Message for patient to call back: yes

## 2017-08-08 NOTE — TELEPHONE ENCOUNTER
Phone Number Called: [phone]    Message: Left message for patient about lab results.     Left Message for patient to call back: yes

## 2017-08-15 ENCOUNTER — OFFICE VISIT (OUTPATIENT)
Dept: MEDICAL GROUP | Facility: MEDICAL CENTER | Age: 37
End: 2017-08-15
Payer: COMMERCIAL

## 2017-08-15 VITALS
BODY MASS INDEX: 33.55 KG/M2 | HEART RATE: 94 BPM | TEMPERATURE: 97.6 F | SYSTOLIC BLOOD PRESSURE: 118 MMHG | HEIGHT: 65 IN | WEIGHT: 201.4 LBS | RESPIRATION RATE: 16 BRPM | DIASTOLIC BLOOD PRESSURE: 78 MMHG | OXYGEN SATURATION: 97 %

## 2017-08-15 DIAGNOSIS — Z00.00 ANNUAL PHYSICAL EXAM: ICD-10-CM

## 2017-08-15 DIAGNOSIS — G89.29 OTHER CHRONIC PAIN: ICD-10-CM

## 2017-08-15 DIAGNOSIS — M79.7 FIBROMYALGIA: ICD-10-CM

## 2017-08-15 PROBLEM — J03.90 ACUTE TONSILLITIS: Status: RESOLVED | Noted: 2017-07-31 | Resolved: 2017-08-15

## 2017-08-15 PROCEDURE — 99395 PREV VISIT EST AGE 18-39: CPT | Performed by: PHYSICIAN ASSISTANT

## 2017-08-15 ASSESSMENT — PATIENT HEALTH QUESTIONNAIRE - PHQ9: CLINICAL INTERPRETATION OF PHQ2 SCORE: 0

## 2017-08-15 NOTE — ASSESSMENT & PLAN NOTE
This is a 37-year-old female who is here today for a physical. She recently had her gallbladder removed. I saw her the same day for tonsillitis. Later that day she developed severe pain right upper quadrant. She was seen at the ED and gallbladder was then excised. She is doing well status post surgery. Complains of slight pain around the incision. She has a chronic history of pain related to fibromyalgia. She does see Dr. zarco and is on Norflex and Neurontin. She does have a rheumatologist who is Dr. Hamlin and is currently taking Plaquenil. Plaquenil is a recent addition so she is waiting to see how it affects her. She is active. She is eating healthier. She is planning to lose more weight. She is concerned about her  family history. She recently had a CMP drawn that was within normal limits. No recent lipid profile.

## 2017-08-15 NOTE — PROGRESS NOTES
"Subjective:   Nicole Ramirez is a 37 y.o. female here today for annual physical.    Annual physical exam  This is a 37-year-old female who is here today for a physical. She recently had her gallbladder removed. I saw her the same day for tonsillitis. Later that day she developed severe pain right upper quadrant. She was seen at the ED and gallbladder was then excised. She is doing well status post surgery. Complains of slight pain around the incision. She has a chronic history of pain related to fibromyalgia. She does see Dr. zarco and is on Norflex and Neurontin. She does have a rheumatologist who is Dr. Hamlin and is currently taking Plaquenil. Plaquenil is a recent addition so she is waiting to see how it affects her. She is active. She is eating healthier. She is planning to lose more weight. She is concerned about her  family history. She recently had a CMP drawn that was within normal limits. No recent lipid profile.         Current medicines (including changes today)  Current Outpatient Prescriptions   Medication Sig Dispense Refill   • hydroxychloroquine (PLAQUENIL) 200 MG Tab Take 200 mg by mouth every day.     • vitamin D (CHOLECALCIFEROL) 1000 UNIT Tab Take 1,000 Units by mouth every day.     • gabapentin (NEURONTIN) 300 MG Cap Take 300 mg by mouth 3 times a day.     • orphenadrine (NORFLEX) 100 MG tablet Take 1 Tab by mouth 2 times a day. 60 Tab 1   • magnesium oxide (MAG-OX) 400 MG Tab Take 400 mg by mouth every day.       No current facility-administered medications for this visit.     She  has a past medical history of Sinusitis, chronic; Allergy; and Muscle spasm.    ROS   No chest pain, no shortness of breath, no abdominal pain and all other systems were reviewed and are negative.       Objective:     Blood pressure 118/78, pulse 94, temperature 36.4 °C (97.6 °F), resp. rate 16, height 1.651 m (5' 5\"), weight 91.354 kg (201 lb 6.4 oz), last menstrual period 07/10/2017, SpO2 97 %. Body " mass index is 33.51 kg/(m^2).   Physical Exam:  Constitutional: Alert, no distress.  Skin: Warm, dry, good turgor, no rashes in visible areas.  Eye: Equal, round and reactive, conjunctiva clear, lids normal.  ENMT: Lips without lesions, good dentition, oropharynx clear.  Neck: Trachea midline, no masses.   Lymph: No cervical or supraclavicular lymphadenopathy  Respiratory: Unlabored respiratory effort, lungs clear to auscultation, no wheezes, no ronchi.  Cardiovascular: Normal S1, S2, no murmur, no edema.  Abdomen: Soft, non-tender, no masses.  Psych: Alert and oriented x3, normal affect and mood.        Assessment and Plan:   The following treatment plan was discussed    1. Annual physical exam  Ordered labs fasting. We'll check her lipid profile and A1c. Follow with her specialists which are rheumatology and pain management. Contact me with any concerns or questions.  - LIPID PROFILE; Future  - HEMOGLOBIN A1C; Future      Followup: Return if symptoms worsen or fail to improve.    Please note that this dictation was created using voice recognition software. I have made every reasonable attempt to correct obvious errors, but I expect that there are errors of grammar and possibly content that I did not discover before finalizing the note.

## 2017-08-18 ENCOUNTER — HOSPITAL ENCOUNTER (OUTPATIENT)
Dept: LAB | Facility: MEDICAL CENTER | Age: 37
End: 2017-08-18
Attending: PHYSICIAN ASSISTANT
Payer: COMMERCIAL

## 2017-08-18 DIAGNOSIS — Z00.00 ANNUAL PHYSICAL EXAM: ICD-10-CM

## 2017-08-18 LAB
CHOLEST SERPL-MCNC: 125 MG/DL (ref 100–199)
EST. AVERAGE GLUCOSE BLD GHB EST-MCNC: 114 MG/DL
HBA1C MFR BLD: 5.6 % (ref 0–5.6)
HDLC SERPL-MCNC: 41 MG/DL
LDLC SERPL CALC-MCNC: 68 MG/DL
TRIGL SERPL-MCNC: 80 MG/DL (ref 0–149)

## 2017-08-18 PROCEDURE — 80061 LIPID PANEL: CPT

## 2017-08-18 PROCEDURE — 36415 COLL VENOUS BLD VENIPUNCTURE: CPT

## 2017-08-18 PROCEDURE — 83036 HEMOGLOBIN GLYCOSYLATED A1C: CPT

## 2017-08-21 ENCOUNTER — TELEPHONE (OUTPATIENT)
Dept: MEDICAL GROUP | Facility: MEDICAL CENTER | Age: 37
End: 2017-08-21

## 2017-08-21 NOTE — TELEPHONE ENCOUNTER
----- Message from Antonio Pappas PA-C sent at 8/20/2017  6:52 PM PDT -----  Please contact Nicole.  Labs are good.  Cholesterol and A1c low.  Thank you.    Antonio

## 2017-08-21 NOTE — TELEPHONE ENCOUNTER
Phone Number Called: [phone]     Message: Patient aware of labs.     Left Message for patient to call back: N\A

## 2017-12-18 ENCOUNTER — HOSPITAL ENCOUNTER (EMERGENCY)
Facility: MEDICAL CENTER | Age: 37
End: 2017-12-18
Attending: EMERGENCY MEDICINE
Payer: COMMERCIAL

## 2017-12-18 VITALS
SYSTOLIC BLOOD PRESSURE: 133 MMHG | HEIGHT: 66 IN | RESPIRATION RATE: 18 BRPM | BODY MASS INDEX: 33.62 KG/M2 | HEART RATE: 71 BPM | DIASTOLIC BLOOD PRESSURE: 70 MMHG | TEMPERATURE: 98.9 F | OXYGEN SATURATION: 99 % | WEIGHT: 209.22 LBS

## 2017-12-18 DIAGNOSIS — R10.13 EPIGASTRIC PAIN: ICD-10-CM

## 2017-12-18 DIAGNOSIS — R19.5 DARK STOOLS: ICD-10-CM

## 2017-12-18 LAB
ALBUMIN SERPL BCP-MCNC: 4 G/DL (ref 3.2–4.9)
ALBUMIN/GLOB SERPL: 1.5 G/DL
ALP SERPL-CCNC: 75 U/L (ref 30–99)
ALT SERPL-CCNC: 21 U/L (ref 2–50)
ANION GAP SERPL CALC-SCNC: 7 MMOL/L (ref 0–11.9)
APPEARANCE UR: CLEAR
APTT PPP: 27.7 SEC (ref 24.7–36)
AST SERPL-CCNC: 19 U/L (ref 12–45)
BASOPHILS # BLD AUTO: 1 % (ref 0–1.8)
BASOPHILS # BLD: 0.07 K/UL (ref 0–0.12)
BILIRUB SERPL-MCNC: 0.5 MG/DL (ref 0.1–1.5)
BILIRUB UR QL STRIP.AUTO: NEGATIVE
BUN SERPL-MCNC: 5 MG/DL (ref 8–22)
CALCIUM SERPL-MCNC: 8.5 MG/DL (ref 8.4–10.2)
CHLORIDE SERPL-SCNC: 106 MMOL/L (ref 96–112)
CO2 SERPL-SCNC: 24 MMOL/L (ref 20–33)
COLOR UR: YELLOW
CREAT SERPL-MCNC: 0.91 MG/DL (ref 0.5–1.4)
CULTURE IF INDICATED INDCX: NO UA CULTURE
EOSINOPHIL # BLD AUTO: 0.11 K/UL (ref 0–0.51)
EOSINOPHIL NFR BLD: 1.5 % (ref 0–6.9)
ERYTHROCYTE [DISTWIDTH] IN BLOOD BY AUTOMATED COUNT: 41.7 FL (ref 35.9–50)
GFR SERPL CREATININE-BSD FRML MDRD: >60 ML/MIN/1.73 M 2
GLOBULIN SER CALC-MCNC: 2.6 G/DL (ref 1.9–3.5)
GLUCOSE SERPL-MCNC: 90 MG/DL (ref 65–99)
GLUCOSE UR STRIP.AUTO-MCNC: NEGATIVE MG/DL
HCG SERPL QL: NEGATIVE
HCT VFR BLD AUTO: 39 % (ref 37–47)
HGB BLD-MCNC: 13.1 G/DL (ref 12–16)
IMM GRANULOCYTES # BLD AUTO: 0.02 K/UL (ref 0–0.11)
IMM GRANULOCYTES NFR BLD AUTO: 0.3 % (ref 0–0.9)
INR PPP: 0.93 (ref 0.87–1.13)
KETONES UR STRIP.AUTO-MCNC: NEGATIVE MG/DL
LEUKOCYTE ESTERASE UR QL STRIP.AUTO: NEGATIVE
LIPASE SERPL-CCNC: 29 U/L (ref 7–58)
LYMPHOCYTES # BLD AUTO: 1.7 K/UL (ref 1–4.8)
LYMPHOCYTES NFR BLD: 23.8 % (ref 22–41)
MCH RBC QN AUTO: 29 PG (ref 27–33)
MCHC RBC AUTO-ENTMCNC: 33.6 G/DL (ref 33.6–35)
MCV RBC AUTO: 86.3 FL (ref 81.4–97.8)
MICRO URNS: NORMAL
MONOCYTES # BLD AUTO: 0.53 K/UL (ref 0–0.85)
MONOCYTES NFR BLD AUTO: 7.4 % (ref 0–13.4)
NEUTROPHILS # BLD AUTO: 4.71 K/UL (ref 2–7.15)
NEUTROPHILS NFR BLD: 66 % (ref 44–72)
NITRITE UR QL STRIP.AUTO: NEGATIVE
NRBC # BLD AUTO: 0 K/UL
NRBC BLD AUTO-RTO: 0 /100 WBC
PH UR STRIP.AUTO: 6.5 [PH]
PLATELET # BLD AUTO: 283 K/UL (ref 164–446)
PMV BLD AUTO: 10 FL (ref 9–12.9)
POTASSIUM SERPL-SCNC: 4.3 MMOL/L (ref 3.6–5.5)
PROT SERPL-MCNC: 6.6 G/DL (ref 6–8.2)
PROT UR QL STRIP: NEGATIVE MG/DL
PROTHROMBIN TIME: 12.1 SEC (ref 12–14.6)
RBC # BLD AUTO: 4.52 M/UL (ref 4.2–5.4)
RBC UR QL AUTO: NEGATIVE
SODIUM SERPL-SCNC: 137 MMOL/L (ref 135–145)
SP GR UR STRIP.AUTO: <=1.005
WBC # BLD AUTO: 7.1 K/UL (ref 4.8–10.8)

## 2017-12-18 PROCEDURE — 85730 THROMBOPLASTIN TIME PARTIAL: CPT

## 2017-12-18 PROCEDURE — 83690 ASSAY OF LIPASE: CPT

## 2017-12-18 PROCEDURE — 80053 COMPREHEN METABOLIC PANEL: CPT

## 2017-12-18 PROCEDURE — 700102 HCHG RX REV CODE 250 W/ 637 OVERRIDE(OP): Performed by: EMERGENCY MEDICINE

## 2017-12-18 PROCEDURE — 85610 PROTHROMBIN TIME: CPT

## 2017-12-18 PROCEDURE — 99284 EMERGENCY DEPT VISIT MOD MDM: CPT

## 2017-12-18 PROCEDURE — 84703 CHORIONIC GONADOTROPIN ASSAY: CPT

## 2017-12-18 PROCEDURE — 85025 COMPLETE CBC W/AUTO DIFF WBC: CPT

## 2017-12-18 PROCEDURE — A9270 NON-COVERED ITEM OR SERVICE: HCPCS | Performed by: EMERGENCY MEDICINE

## 2017-12-18 PROCEDURE — 81003 URINALYSIS AUTO W/O SCOPE: CPT

## 2017-12-18 PROCEDURE — 36415 COLL VENOUS BLD VENIPUNCTURE: CPT

## 2017-12-18 RX ORDER — IBUPROFEN 200 MG
400 TABLET ORAL EVERY 6 HOURS PRN
Status: SHIPPED | COMMUNITY
End: 2018-12-11

## 2017-12-18 RX ORDER — TRAMADOL HYDROCHLORIDE 50 MG/1
50 TABLET ORAL EVERY 4 HOURS PRN
Status: SHIPPED | COMMUNITY
End: 2018-12-11

## 2017-12-18 RX ORDER — ORPHENADRINE CITRATE 100 MG/1
300 TABLET, EXTENDED RELEASE ORAL 2 TIMES DAILY
COMMUNITY

## 2017-12-18 RX ORDER — FAMOTIDINE 20 MG/1
20 TABLET, FILM COATED ORAL 2 TIMES DAILY
COMMUNITY

## 2017-12-18 RX ADMIN — LIDOCAINE HYDROCHLORIDE 30 ML: 20 SOLUTION OROPHARYNGEAL at 11:11

## 2017-12-18 ASSESSMENT — PAIN SCALES - GENERAL
PAINLEVEL_OUTOF10: 7
PAINLEVEL_OUTOF10: 2

## 2017-12-18 NOTE — DISCHARGE INSTRUCTIONS
Abdominal Pain (Nonspecific)  Your exam might not show the exact reason you have abdominal pain. Since there are many different causes of abdominal pain, another checkup and more tests may be needed. It is very important to follow up for lasting (persistent) or worsening symptoms. A possible cause of abdominal pain in any person who still has his or her appendix is acute appendicitis. Appendicitis is often hard to diagnose. Normal blood tests, urine tests, ultrasound, and CT scans do not completely rule out early appendicitis or other causes of abdominal pain. Sometimes, only the changes that happen over time will allow appendicitis and other causes of abdominal pain to be determined. Other potential problems that may require surgery may also take time to become more apparent. Because of this, it is important that you follow all of the instructions below.  HOME CARE INSTRUCTIONS   · Rest as much as possible.   · Do not eat solid food until your pain is gone.   · While adults or children have pain: A diet of water, weak decaffeinated tea, broth or bouillon, gelatin, oral rehydration solutions (ORS), frozen ice pops, or ice chips may be helpful.   · When pain is gone in adults or children: Start a light diet (dry toast, crackers, applesauce, or white rice). Increase the diet slowly as long as it does not bother you. Eat no dairy products (including cheese and eggs) and no spicy, fatty, fried, or high-fiber foods.   · Use no alcohol, caffeine, or cigarettes.   · Take your regular medicines unless your caregiver told you not to.   · Take any prescribed medicine as directed.   · Only take over-the-counter or prescription medicines for pain, discomfort, or fever as directed by your caregiver. Do not give aspirin to children.   If your caregiver has given you a follow-up appointment, it is very important to keep that appointment. Not keeping the appointment could result in a permanent injury and/or lasting (chronic) pain  and/or disability. If there is any problem keeping the appointment, you must call to reschedule.   SEEK IMMEDIATE MEDICAL CARE IF:   · Your pain is not gone in 24 hours.   · Your pain becomes worse, changes location, or feels different.   · You or your child has an oral temperature above 102° F (38.9° C), not controlled by medicine.   · Your baby is older than 3 months with a rectal temperature of 102° F (38.9° C) or higher.   · Your baby is 3 months old or younger with a rectal temperature of 100.4° F (38° C) or higher.   · You have shaking chills.   · You keep throwing up (vomiting) or cannot drink liquids.   · There is blood in your vomit or you see blood in your bowel movements.   · Your bowel movements become dark or black.   · You have frequent bowel movements.   · Your bowel movements stop (become blocked) or you cannot pass gas.   · You have bloody, frequent, or painful urination.   · You have yellow discoloration in the skin or whites of the eyes.   · Your stomach becomes bloated or bigger.   · You have dizziness or fainting.   · You have chest or back pain.   MAKE SURE YOU:   · Understand these instructions.   · Will watch your condition.   · Will get help right away if you are not doing well or get worse.   Document Released: 12/18/2006 Document Revised: 03/11/2013 Document Reviewed: 11/15/2010  [employer]® Patient Information ©2013 [employer].

## 2017-12-18 NOTE — ED NOTES
"Chief Complaint   Patient presents with   • Abdominal Pain     mid-abd, started last Thursday   • Nausea     /75   Pulse 84   Temp 37.2 °C (98.9 °F)   Resp 16   Ht 1.676 m (5' 6\")   Wt 94.9 kg (209 lb 3.5 oz)   LMP 12/15/2017   SpO2 98%   BMI 33.77 kg/m²     "

## 2017-12-18 NOTE — ED NOTES
Iv placed , labs drawn and taken to lab. Pt medicated as directed by FABIENNE mcpherson, poc update given to pt. Further orders and dispo pending at this time. No further questions from pt at this time

## 2017-12-18 NOTE — ED NOTES
"Med rec updated and complete  Allergies reviewed, asked pharmacists if I could add BEE's and VICRYL to allergie list.  Pt states \"No antibiotics in the last 30 days\".  Pt had a list on her phone went over list of medications.    "

## 2017-12-18 NOTE — ED PROVIDER NOTES
ED Provider Note    CHIEF COMPLAINT  Chief Complaint   Patient presents with   • Abdominal Pain     mid-abd, started last Thursday   • Nausea       HPI  Nicole Ramirez is a 37 y.o. female who presents with 4 days of abdominal pain described as mid abdomen and right upper quadrant.  She states the pain is similar to previous condition requiring her gallbladder to be removed.  No jaundice.  Pain is worse with eating.  She noticed dark stool the past 3 days.  Patient denies using Pepto-Bismol.  No hematemesis.  No lower abdominal pain, no flank pain.    REVIEW OF SYSTEMS  Constitutional: No fever  Respiratory: No shortness of breath  Cardiac: No chest pain or syncope  Gastrointestinal: Abdominal pain, dark stool  Musculoskeletal: No acute back pain  Neurologic: No headache  Genitourinary: No dysuria       All other systems are negative.     PAST MEDICAL HISTORY  Past Medical History:   Diagnosis Date   • Allergy    • Muscle spasm    • Sinusitis, chronic        FAMILY HISTORY  Family History   Problem Relation Age of Onset   • Heart Disease Mother    • Hypertension Mother    • Hyperlipidemia Mother    • Cancer Father      skin   • Cancer Paternal Grandmother      skin   • Cancer Maternal Grandmother      bowel   • Heart Disease Maternal Grandmother    • Hypertension Maternal Grandmother    • Heart Disease Maternal Aunt    • Heart Disease Maternal Uncle    • Hypertension Maternal Aunt    • Hypertension Maternal Uncle    • Heart Disease Maternal Grandfather    • Stroke Maternal Aunt    • Diabetes Neg Hx    • Psychiatry Neg Hx    • Thyroid Neg Hx        SOCIAL HISTORY  Social History     Social History   • Marital status: Single     Spouse name: N/A   • Number of children: N/A   • Years of education: N/A     Social History Main Topics   • Smoking status: Former Smoker     Years: 14.00     Types: Cigarettes     Quit date: 6/3/2008   • Smokeless tobacco: Never Used   • Alcohol use No   • Drug use: No   • Sexual activity: Yes  "    Partners: Female     Other Topics Concern   • Not on file     Social History Narrative    Lives with 2 children, mother and best friend.     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;  Service:    • PLASTIC SURGERY  2007    breast reduction   • PRIMARY C SECTION         CURRENT MEDICATIONS  Home Medications     Reviewed by Malena Fung (Pharmacy Tech) on 12/18/17 at 1057  Med List Status: Complete   Medication Last Dose Status   famotidine (PEPCID) 20 MG Tab 12/17/2017 Active   gabapentin (NEURONTIN) 300 MG Cap 12/17/2017 Active   hydroxychloroquine (PLAQUENIL) 200 MG Tab 12/17/2017 Active   ibuprofen (MOTRIN) 200 MG Tab 12/15/2017 Active   orphenadrine (NORFLEX) 100 MG tablet 12/17/2017 Active   tramadol (ULTRAM) 50 MG Tab 12/15/2017 Active   vitamin D (CHOLECALCIFEROL) 1000 UNIT Tab 12/17/2017 Active                ALLERGIES  Allergies   Allergen Reactions   • Bee Anaphylaxis   • Morphine Shortness of Breath   • Other Misc Rash     Vicryl  \"Redness and inflammation\".     • Wellbutrin [Bupropion]      Rapid HR       PHYSICAL EXAM  VITAL SIGNS: /70   Pulse 71   Temp 37.2 °C (98.9 °F)   Resp 18   Ht 1.676 m (5' 6\")   Wt 94.9 kg (209 lb 3.5 oz)   LMP 12/15/2017   SpO2 99%   BMI 33.77 kg/m²   Constitutional:Well-nourished  ENT: Nares clear, mucous membranes moist.  Eyes:  Conjunctiva normal, No discharge.    Lymphatic: No adenopathy.   Cardiovascular: Normal heart rate, Normal rhythm.   Pulmonary: No wheezing, no rales  Gastrointestinal: Tenderness epigastric.  No pain in the right upper or left upper quadrant.  Lower abdomen is soft and nontender.  Skin: Warm, Dry, No erythema, No jaundice.   Musculoskeletal:  No CVA tenderness.   Neurologic: Alert & oriented x 3, " Normal motor and sensory function, No focal deficits noted.   Psychiatric:Normal affect, Normal mood.    RADIOLOGY/PROCEDURES/Labs  Results for orders placed or performed during the hospital encounter of 12/18/17   CBC WITH DIFFERENTIAL   Result Value Ref Range    WBC 7.1 4.8 - 10.8 K/uL    RBC 4.52 4.20 - 5.40 M/uL    Hemoglobin 13.1 12.0 - 16.0 g/dL    Hematocrit 39.0 37.0 - 47.0 %    MCV 86.3 81.4 - 97.8 fL    MCH 29.0 27.0 - 33.0 pg    MCHC 33.6 33.6 - 35.0 g/dL    RDW 41.7 35.9 - 50.0 fL    Platelet Count 283 164 - 446 K/uL    MPV 10.0 9.0 - 12.9 fL    Neutrophils-Polys 66.00 44.00 - 72.00 %    Lymphocytes 23.80 22.00 - 41.00 %    Monocytes 7.40 0.00 - 13.40 %    Eosinophils 1.50 0.00 - 6.90 %    Basophils 1.00 0.00 - 1.80 %    Immature Granulocytes 0.30 0.00 - 0.90 %    Nucleated RBC 0.00 /100 WBC    Neutrophils (Absolute) 4.71 2.00 - 7.15 K/uL    Lymphs (Absolute) 1.70 1.00 - 4.80 K/uL    Monos (Absolute) 0.53 0.00 - 0.85 K/uL    Eos (Absolute) 0.11 0.00 - 0.51 K/uL    Baso (Absolute) 0.07 0.00 - 0.12 K/uL    Immature Granulocytes (abs) 0.02 0.00 - 0.11 K/uL    NRBC (Absolute) 0.00 K/uL   COMP METABOLIC PANEL   Result Value Ref Range    Sodium 137 135 - 145 mmol/L    Potassium 4.3 3.6 - 5.5 mmol/L    Chloride 106 96 - 112 mmol/L    Co2 24 20 - 33 mmol/L    Anion Gap 7.0 0.0 - 11.9    Glucose 90 65 - 99 mg/dL    Bun 5 (L) 8 - 22 mg/dL    Creatinine 0.91 0.50 - 1.40 mg/dL    Calcium 8.5 8.4 - 10.2 mg/dL    AST(SGOT) 19 12 - 45 U/L    ALT(SGPT) 21 2 - 50 U/L    Alkaline Phosphatase 75 30 - 99 U/L    Total Bilirubin 0.5 0.1 - 1.5 mg/dL    Albumin 4.0 3.2 - 4.9 g/dL    Total Protein 6.6 6.0 - 8.2 g/dL    Globulin 2.6 1.9 - 3.5 g/dL    A-G Ratio 1.5 g/dL   LIPASE   Result Value Ref Range    Lipase 29 7 - 58 U/L   URINALYSIS,CULTURE IF INDICATED   Result Value Ref Range    Color Yellow     Character Clear     Specific Gravity <=1.005 <1.035    Ph 6.5 5.0 - 8.0    Glucose Negative Negative mg/dL    Ketones Negative  Negative mg/dL    Protein Negative Negative mg/dL    Bilirubin Negative Negative    Nitrite Negative Negative    Leukocyte Esterase Negative Negative    Occult Blood Negative Negative    Micro Urine Req see below     Culture Indicated No UA Culture   APTT   Result Value Ref Range    APTT 27.7 24.7 - 36.0 sec   PROTHROMBIN TIME   Result Value Ref Range    PT 12.1 12.0 - 14.6 sec    INR 0.93 0.87 - 1.13   HCG QUAL SERUM   Result Value Ref Range    Beta-Hcg Qualitative Serum Negative Negative   ESTIMATED GFR   Result Value Ref Range    GFR If African American >60 >60 mL/min/1.73 m 2    GFR If Non African American >60 >60 mL/min/1.73 m 2         COURSE & MEDICAL DECISION MAKING  Pertinent Labs & Imaging studies reviewed. (See chart for details)  Patient is unremarkable urine and laboratory evaluation.  Etiology of her discomfort is unknown.  Rectal exam was heme-negative and her hemoglobin is normal.  She has been provided outpatient referral to gastroenterology.  She will take over-the-counter proton pump inhibitor Prilosec.  She is advised she should return for any signs or symptoms of anemia which were reviewed with her, or uncontrollable pain, or for any concerns.    FINAL IMPRESSION  1. Epigastric pain    2. Dark stools            Electronically signed by: Vitor Arzate, 12/18/2017 5:34 PM

## 2018-12-11 ENCOUNTER — OFFICE VISIT (OUTPATIENT)
Dept: MEDICAL GROUP | Facility: MEDICAL CENTER | Age: 38
End: 2018-12-11
Payer: COMMERCIAL

## 2018-12-11 VITALS
TEMPERATURE: 97.2 F | BODY MASS INDEX: 34.62 KG/M2 | WEIGHT: 215.4 LBS | OXYGEN SATURATION: 98 % | HEIGHT: 66 IN | HEART RATE: 86 BPM | DIASTOLIC BLOOD PRESSURE: 70 MMHG | SYSTOLIC BLOOD PRESSURE: 114 MMHG

## 2018-12-11 DIAGNOSIS — M35.9 CONNECTIVE TISSUE DISEASE, UNDIFFERENTIATED (HCC): ICD-10-CM

## 2018-12-11 DIAGNOSIS — K20.0 EOSINOPHILIC ESOPHAGITIS: ICD-10-CM

## 2018-12-11 DIAGNOSIS — Z23 NEED FOR TDAP VACCINATION: ICD-10-CM

## 2018-12-11 DIAGNOSIS — Z00.00 PREVENTATIVE HEALTH CARE: ICD-10-CM

## 2018-12-11 DIAGNOSIS — E55.9 VITAMIN D DEFICIENCY: ICD-10-CM

## 2018-12-11 DIAGNOSIS — H91.93 DECREASED HEARING OF BOTH EARS: ICD-10-CM

## 2018-12-11 DIAGNOSIS — E66.9 OBESITY (BMI 30-39.9): ICD-10-CM

## 2018-12-11 PROBLEM — G89.29 CHRONIC PAIN: Status: RESOLVED | Noted: 2017-08-15 | Resolved: 2018-12-11

## 2018-12-11 PROCEDURE — 90471 IMMUNIZATION ADMIN: CPT | Performed by: PHYSICIAN ASSISTANT

## 2018-12-11 PROCEDURE — 99214 OFFICE O/P EST MOD 30 MIN: CPT | Mod: 25 | Performed by: PHYSICIAN ASSISTANT

## 2018-12-11 PROCEDURE — 90715 TDAP VACCINE 7 YRS/> IM: CPT | Performed by: PHYSICIAN ASSISTANT

## 2018-12-11 RX ORDER — AMLODIPINE BESYLATE 2.5 MG/1
TABLET ORAL
COMMUNITY
Start: 2018-11-17

## 2018-12-11 RX ORDER — PYRIDOXINE HCL (VITAMIN B6) 50 MG
TABLET ORAL
COMMUNITY

## 2018-12-11 ASSESSMENT — PATIENT HEALTH QUESTIONNAIRE - PHQ9: CLINICAL INTERPRETATION OF PHQ2 SCORE: 0

## 2018-12-11 NOTE — ASSESSMENT & PLAN NOTE
Has a new job at the [employer].  With a new job may be able to work out more with having a better schedule.

## 2018-12-11 NOTE — PROGRESS NOTES
Subjective:   Nicole Ramirez is a 38 y.o. female here today for connective tissue disease, eosinophilic esophagitis, vitamin D deficiency, decreased hearing of both ears, obesity and preventative health care.    Connective tissue disease, undifferentiated (HCC)  This is a 38-year-old female who is here today to discuss several of her chronic medical conditions.  Last year I saw her for an annual physical.  This year she has been seen by Dr. Chintan Hamlin of rheumatology and diagnosed with a undifferentiated connective tissue disease.  She is currently on medications that help make her symptoms manageable.  She will has pain daily but she is able to deal with it without taking any narcotics.  She is not on tramadol.  She is currently on Plaquenil as well as takes Neurontin for flares.  Also on a muscle spasm medication Norflex.  Still has the diagnosis of fibromyalgia as well.  Also is on amlodipine 2.5 mg.  Also takes magnesium and other supplements.  Feels like that her symptoms are well controlled.    Eosinophilic esophagitis  Was seen at [facility].  Had an upper endoscopy.  We do not have those records.  She is currently on Pepcid 20 mg twice a day which helps with her reflux symptoms.  She states that PPIs were not effective and she was also misdiagnosed initially with reflux.    Vitamin D deficiency  Has a chronic history of vitamin D deficiency.  Is currently on 10,000 units daily.  States she believes her last lab was at 22.    Decreased hearing of both ears  Has a chronic history of not being able to hear background noises.  She would like to see an audiologist.    Obesity (BMI 30-39.9)  Has a new job at the [employer].  With a new job may be able to work out more with having a better schedule.    Preventative health care  Has a family history of heart disease.  Needs labs today.  Last labs last year were in normal limits.    Need for Tdap vaccination  Requesting her tetanus, diphtheria and pertussis vaccination.    "    Current medicines (including changes today)  Current Outpatient Prescriptions   Medication Sig Dispense Refill   • amLODIPine (NORVASC) 2.5 MG Tab      • Taurine 1000 MG Cap Take  by mouth.     • MAGNESIUM PO Take 400 mg by mouth.     • Cyanocobalamin (B-12) 100 MCG Tab Take  by mouth.     • orphenadrine (NORFLEX) 100 MG tablet Take 300 mg by mouth 2 times a day.     • famotidine (PEPCID) 20 MG Tab Take 20 mg by mouth 2 times a day.     • hydroxychloroquine (PLAQUENIL) 200 MG Tab Take 200 mg by mouth 2 times a day.     • vitamin D (CHOLECALCIFEROL) 1000 UNIT Tab Take 1,000 Units by mouth every day.     • gabapentin (NEURONTIN) 300 MG Cap Take 300 mg by mouth 3 times a day.       No current facility-administered medications for this visit.      She  has a past medical history of Allergy; Muscle spasm; and Sinusitis, chronic.    Social History and Family History were reviewed and updated.    ROS   No chest pain, no shortness of breath, no abdominal pain and all other systems were reviewed and are negative.       Objective:     Blood pressure 114/70, pulse 86, temperature 36.2 °C (97.2 °F), height 1.676 m (5' 6\"), weight 97.7 kg (215 lb 6.4 oz), SpO2 98 %, not currently breastfeeding. Body mass index is 34.77 kg/m².   Physical Exam:  Constitutional: Alert, no distress.  Skin: Warm, dry, good turgor, no rashes in visible areas.  Eye: Equal, round and reactive, conjunctiva clear, lids normal.  ENMT: Lips without lesions, good dentition, oropharynx clear.  Neck: Trachea midline, no masses.   Lymph: No cervical or supraclavicular lymphadenopathy  Respiratory: Unlabored respiratory effort, lungs clear to auscultation, no wheezes, no ronchi.  Cardiovascular: Normal S1, S2, no murmur, no edema.  Abdomen: Soft, non-tender, no masses.  Psych: Alert and oriented x3, normal affect and mood.        Assessment and Plan:   The following treatment plan was discussed    1. Connective tissue disease, undifferentiated (HCC)  Chronic " condition.  Stable.  Continue to follow with rheumatology.  Continue medications as directed.    2. Eosinophilic esophagitis  Chronic condition.  Stable.  Continue Pepcid as directed per Will obtain previous medical records from [facility].    3. Vitamin D deficiency  Chronic condition.  Status unknown.  Ordered vitamin D level.  May continue vitamin D supplement.  - VITAMIN D,25 HYDROXY; Future    4. Preventative health care  Chronic condition.  Ordered labs as part of prevention.  - HEMOGLOBIN A1C; Future  - Lipid Profile; Future  - COMP METABOLIC PANEL; Future  - TSH WITH REFLEX TO FT4; Future    5. Decreased hearing of both ears  New condition noted but chronic.  Refer to audiology to establish care.  - REFERRAL TO AUDIOLOGY    6. Obesity (BMI 30-39.9)  Chronic condition.  Exercise routinely and eat healthier.  - Patient identified as having weight management issue.  Appropriate orders and counseling given.    7. Need for Tdap vaccination  Administered in the left deltoid without complaints.  Discussed side effects.  - Tdap =>6yo IM      Followup: Return in about 1 year (around 12/11/2019), or if symptoms worsen or fail to improve.    Please note that this dictation was created using voice recognition software. I have made every reasonable attempt to correct obvious errors, but I expect that there are errors of grammar and possibly content that I did not discover before finalizing the note.

## 2018-12-11 NOTE — ASSESSMENT & PLAN NOTE
Was seen at [facility].  Had an upper endoscopy.  We do not have those records.  She is currently on Pepcid 20 mg twice a day which helps with her reflux symptoms.  She states that PPIs were not effective and she was also misdiagnosed initially with reflux.

## 2018-12-11 NOTE — ASSESSMENT & PLAN NOTE
This is a 38-year-old female who is here today to discuss several of her chronic medical conditions.  Last year I saw her for an annual physical.  This year she has been seen by Dr. Chintan Hamlin of rheumatology and diagnosed with a undifferentiated connective tissue disease.  She is currently on medications that help make her symptoms manageable.  She will has pain daily but she is able to deal with it without taking any narcotics.  She is not on tramadol.  She is currently on Plaquenil as well as takes Neurontin for flares.  Also on a muscle spasm medication Norflex.  Still has the diagnosis of fibromyalgia as well.  Also is on amlodipine 2.5 mg.  Also takes magnesium and other supplements.  Feels like that her symptoms are well controlled.

## 2018-12-11 NOTE — LETTER
[facility]  Antonio Pappas P.A.-C.  [address]  [facility] [address]  Fax: [phone]   Authorization for Release/Disclosure of   Protected Health Information   Name: ESTHER WEATHERS : 1980 SSN: [id number]   Address: [address] [place] [address] Phone:    [phone] (home)    I authorize the entity listed below to release/disclose the PHI below to:   [facility]/Antonio Pappas P.A.-C. and Antonio Pappas P.A.-C.   Provider or Entity Name:  [facility]   Address   City, State, Zip   Phone:      Fax:     Reason for request: continuity of care   Information to be released:    [  ] LAST COLONOSCOPY,  including any PATH REPORT and follow-up  [  ] LAST FIT/COLOGUARD RESULT [  ] LAST DEXA  [  ] LAST MAMMOGRAM  [  ] LAST PAP  [  ] LAST LABS [  ] RETINA EXAM REPORT  [  ] IMMUNIZATION RECORDS  [ X ] Release all info      [  ] Check here and initial the line next to each item to release ALL health information INCLUDING  _____ Care and treatment for drug and / or alcohol abuse  _____ HIV testing, infection status, or AIDS  _____ Genetic Testing    DATES OF SERVICE OR TIME PERIOD TO BE DISCLOSED: _____________  I understand and acknowledge that:  * This Authorization may be revoked at any time by you in writing, except if your health information has already been used or disclosed.  * Your health information that will be used or disclosed as a result of you signing this authorization could be re-disclosed by the recipient. If this occurs, your re-disclosed health information may no longer be protected by State or Federal laws.  * You may refuse to sign this Authorization. Your refusal will not affect your ability to obtain treatment.  * This Authorization becomes effective upon signing and will  on (date) __________.      If no date is indicated, this Authorization will  one (1) year from the signature date.    Name: Esther Weathers    Signature:   Date:     2018       PLEASE FAX  REQUESTED RECORDS BACK TO: [phone]

## 2018-12-11 NOTE — ASSESSMENT & PLAN NOTE
Has a chronic history of not being able to hear background noises.  She would like to see an audiologist.

## 2018-12-11 NOTE — ASSESSMENT & PLAN NOTE
Has a family history of heart disease.  Needs labs today.  Last labs last year were in normal limits.

## 2018-12-11 NOTE — ASSESSMENT & PLAN NOTE
Has a chronic history of vitamin D deficiency.  Is currently on 10,000 units daily.  States she believes her last lab was at 22.

## 2018-12-18 ENCOUNTER — HOSPITAL ENCOUNTER (OUTPATIENT)
Dept: LAB | Facility: MEDICAL CENTER | Age: 38
End: 2018-12-18
Attending: PHYSICIAN ASSISTANT
Payer: COMMERCIAL

## 2018-12-18 DIAGNOSIS — E55.9 VITAMIN D DEFICIENCY: ICD-10-CM

## 2018-12-18 DIAGNOSIS — Z00.00 PREVENTATIVE HEALTH CARE: ICD-10-CM

## 2018-12-18 LAB
25(OH)D3 SERPL-MCNC: 88 NG/ML (ref 30–100)
ALBUMIN SERPL BCP-MCNC: 4.4 G/DL (ref 3.2–4.9)
ALBUMIN/GLOB SERPL: 1.8 G/DL
ALP SERPL-CCNC: 60 U/L (ref 30–99)
ALT SERPL-CCNC: 28 U/L (ref 2–50)
ANION GAP SERPL CALC-SCNC: 8 MMOL/L (ref 0–11.9)
AST SERPL-CCNC: 19 U/L (ref 12–45)
BILIRUB SERPL-MCNC: 0.3 MG/DL (ref 0.1–1.5)
BUN SERPL-MCNC: 11 MG/DL (ref 8–22)
CALCIUM SERPL-MCNC: 9.6 MG/DL (ref 8.5–10.5)
CHLORIDE SERPL-SCNC: 106 MMOL/L (ref 96–112)
CHOLEST SERPL-MCNC: 146 MG/DL (ref 100–199)
CO2 SERPL-SCNC: 25 MMOL/L (ref 20–33)
CREAT SERPL-MCNC: 1.02 MG/DL (ref 0.5–1.4)
EST. AVERAGE GLUCOSE BLD GHB EST-MCNC: 108 MG/DL
FASTING STATUS PATIENT QL REPORTED: NORMAL
GLOBULIN SER CALC-MCNC: 2.5 G/DL (ref 1.9–3.5)
GLUCOSE SERPL-MCNC: 93 MG/DL (ref 65–99)
HBA1C MFR BLD: 5.4 % (ref 0–5.6)
HDLC SERPL-MCNC: 53 MG/DL
LDLC SERPL CALC-MCNC: 83 MG/DL
POTASSIUM SERPL-SCNC: 4.1 MMOL/L (ref 3.6–5.5)
PROT SERPL-MCNC: 6.9 G/DL (ref 6–8.2)
SODIUM SERPL-SCNC: 139 MMOL/L (ref 135–145)
TRIGL SERPL-MCNC: 52 MG/DL (ref 0–149)
TSH SERPL DL<=0.005 MIU/L-ACNC: 1.09 UIU/ML (ref 0.38–5.33)

## 2018-12-18 PROCEDURE — 82306 VITAMIN D 25 HYDROXY: CPT

## 2018-12-18 PROCEDURE — 83036 HEMOGLOBIN GLYCOSYLATED A1C: CPT

## 2018-12-18 PROCEDURE — 80053 COMPREHEN METABOLIC PANEL: CPT

## 2018-12-18 PROCEDURE — 36415 COLL VENOUS BLD VENIPUNCTURE: CPT

## 2018-12-18 PROCEDURE — 84443 ASSAY THYROID STIM HORMONE: CPT

## 2018-12-18 PROCEDURE — 80061 LIPID PANEL: CPT

## 2019-07-02 DIAGNOSIS — F41.9 ANXIETY: ICD-10-CM

## 2020-03-11 ENCOUNTER — OFFICE VISIT (OUTPATIENT)
Dept: URGENT CARE | Facility: CLINIC | Age: 40
End: 2020-03-11
Payer: COMMERCIAL

## 2020-03-11 VITALS
HEART RATE: 113 BPM | DIASTOLIC BLOOD PRESSURE: 84 MMHG | WEIGHT: 225 LBS | BODY MASS INDEX: 36.16 KG/M2 | OXYGEN SATURATION: 95 % | TEMPERATURE: 98.5 F | SYSTOLIC BLOOD PRESSURE: 122 MMHG | RESPIRATION RATE: 14 BRPM | HEIGHT: 66 IN

## 2020-03-11 DIAGNOSIS — G43.009 MIGRAINE WITHOUT AURA AND WITHOUT STATUS MIGRAINOSUS, NOT INTRACTABLE: ICD-10-CM

## 2020-03-11 DIAGNOSIS — R50.9 FEVER, UNSPECIFIED FEVER CAUSE: ICD-10-CM

## 2020-03-11 DIAGNOSIS — R11.0 NAUSEA: ICD-10-CM

## 2020-03-11 LAB
FLUAV+FLUBV AG SPEC QL IA: NORMAL
INT CON NEG: NORMAL
INT CON NEG: NORMAL
INT CON POS: NORMAL
INT CON POS: NORMAL
S PYO AG THROAT QL: NEGATIVE

## 2020-03-11 PROCEDURE — 87804 INFLUENZA ASSAY W/OPTIC: CPT | Performed by: NURSE PRACTITIONER

## 2020-03-11 PROCEDURE — 99214 OFFICE O/P EST MOD 30 MIN: CPT | Performed by: NURSE PRACTITIONER

## 2020-03-11 PROCEDURE — 87880 STREP A ASSAY W/OPTIC: CPT | Mod: QW | Performed by: NURSE PRACTITIONER

## 2020-03-11 RX ORDER — DULOXETIN HYDROCHLORIDE 30 MG/1
30 CAPSULE, DELAYED RELEASE ORAL DAILY
COMMUNITY

## 2020-03-11 RX ORDER — ONDANSETRON 2 MG/ML
4 INJECTION INTRAMUSCULAR; INTRAVENOUS ONCE
Status: COMPLETED | OUTPATIENT
Start: 2020-03-11 | End: 2020-03-11

## 2020-03-11 RX ORDER — KETOROLAC TROMETHAMINE 30 MG/ML
30 INJECTION, SOLUTION INTRAMUSCULAR; INTRAVENOUS ONCE
Status: DISCONTINUED | OUTPATIENT
Start: 2020-03-11 | End: 2020-03-11

## 2020-03-11 RX ORDER — KETOROLAC TROMETHAMINE 30 MG/ML
30 INJECTION, SOLUTION INTRAMUSCULAR; INTRAVENOUS ONCE
Status: COMPLETED | OUTPATIENT
Start: 2020-03-11 | End: 2020-03-11

## 2020-03-11 RX ADMIN — KETOROLAC TROMETHAMINE 30 MG: 30 INJECTION, SOLUTION INTRAMUSCULAR; INTRAVENOUS at 18:32

## 2020-03-11 RX ADMIN — ONDANSETRON 4 MG: 2 INJECTION INTRAMUSCULAR; INTRAVENOUS at 18:32

## 2020-03-11 ASSESSMENT — ENCOUNTER SYMPTOMS
FEVER: 0
CHILLS: 0
SHORTNESS OF BREATH: 0
PHOTOPHOBIA: 1
VOMITING: 0
VISUAL CHANGE: 0
EYE WATERING: 0
MIGRAINE HEADACHES: 1
NAUSEA: 0
EYE PAIN: 0
COUGH: 1
SEIZURES: 0
NECK PAIN: 1
ABDOMINAL PAIN: 0
HEADACHES: 1
SORE THROAT: 0
SINUS PAIN: 0
SCALP TENDERNESS: 0
DIZZINESS: 1
MYALGIAS: 0
BLURRED VISION: 0

## 2020-03-12 NOTE — PROGRESS NOTES
Subjective:   Nicole Ramirez  is a 40 y.o. female who presents for Fever (and bodyaches, x 10:30 am this morning )        URI    This is a new problem. The current episode started today. The problem has been unchanged. There has been no fever. Associated symptoms include congestion, coughing, headaches and neck pain. Pertinent negatives include no abdominal pain, chest pain, ear pain, nausea, plugged ear sensation, rash, sinus pain, sore throat or vomiting. She has tried acetaminophen for the symptoms. The treatment provided no relief.   Migraine    This is a new problem. The current episode started today. The problem occurs constantly. The problem has been unchanged. The pain is located in the bilateral region. The pain does not radiate. The pain quality is not similar to prior headaches. The quality of the pain is described as aching. The pain is at a severity of 4/10. The pain is moderate. Associated symptoms include coughing, dizziness, neck pain, phonophobia and photophobia. Pertinent negatives include no abdominal pain, blurred vision, ear pain, eye pain, eye watering, fever, muscle aches, nausea, scalp tenderness, seizures, sore throat, visual change or vomiting. The symptoms are aggravated by bright light. She has tried NSAIDs for the symptoms. The treatment provided no relief. Her past medical history is significant for migraine headaches.     Review of Systems   Constitutional: Negative for chills and fever.   HENT: Positive for congestion. Negative for ear pain, sinus pain and sore throat.    Eyes: Positive for photophobia. Negative for blurred vision and pain.   Respiratory: Positive for cough. Negative for shortness of breath.    Cardiovascular: Negative for chest pain.   Gastrointestinal: Negative for abdominal pain, nausea and vomiting.   Genitourinary: Negative for hematuria.   Musculoskeletal: Positive for neck pain. Negative for myalgias.   Skin: Negative for rash.   Neurological: Positive  "for dizziness and headaches. Negative for seizures.     Allergies   Allergen Reactions   • Bee Anaphylaxis   • Morphine Shortness of Breath   • Other Misc Rash     Vicryl  \"Redness and inflammation\".     • Wellbutrin [Bupropion]      Rapid HR      Objective:   /84   Pulse (!) 113   Temp 36.9 °C (98.5 °F) (Temporal)   Resp 14   Ht 1.676 m (5' 6\")   Wt 102.1 kg (225 lb)   SpO2 95%   BMI 36.32 kg/m²   Physical Exam  Vitals signs and nursing note reviewed.   Constitutional:       General: She is not in acute distress.     Appearance: She is well-developed.   HENT:      Head: Normocephalic and atraumatic.      Right Ear: Tympanic membrane and external ear normal.      Left Ear: Tympanic membrane and external ear normal.      Nose: Nose normal.      Right Sinus: No maxillary sinus tenderness or frontal sinus tenderness.      Left Sinus: No maxillary sinus tenderness or frontal sinus tenderness.      Mouth/Throat:      Mouth: Mucous membranes are moist.      Pharynx: Uvula midline. No posterior oropharyngeal erythema.      Tonsils: No tonsillar exudate or tonsillar abscesses.   Eyes:      General:         Right eye: No discharge.         Left eye: No discharge.      Conjunctiva/sclera: Conjunctivae normal.      Pupils: Pupils are equal, round, and reactive to light.   Neck:      Musculoskeletal: Full passive range of motion without pain. No neck rigidity.      Meningeal: Brudzinski's sign and Kernig's sign absent.   Cardiovascular:      Rate and Rhythm: Normal rate and regular rhythm.      Heart sounds: No murmur.   Pulmonary:      Effort: Pulmonary effort is normal. No respiratory distress.      Breath sounds: Normal breath sounds.   Abdominal:      General: There is no distension.      Palpations: Abdomen is soft.      Tenderness: There is no abdominal tenderness.   Musculoskeletal: Normal range of motion.   Skin:     General: Skin is warm and dry.   Neurological:      General: No focal deficit present.      " Mental Status: She is alert and oriented to person, place, and time. Mental status is at baseline. She is not disoriented.      GCS: GCS eye subscore is 4. GCS verbal subscore is 5. GCS motor subscore is 6.      Cranial Nerves: No cranial nerve deficit.      Sensory: No sensory deficit.      Gait: Gait (gait at baseline) normal.      Deep Tendon Reflexes: Reflexes are normal and symmetric.   Psychiatric:         Judgment: Judgment normal.           Assessment/Plan:     1. Fever, unspecified fever cause  POCT Rapid Strep A    POCT Influenza A/B   2. Migraine without aura and without status migrainosus, not intractable  ketorolac (TORADOL) injection 30 mg    DISCONTINUED: ketorolac (TORADOL) injection 30 mg   3. Nausea  ondansetron (ZOFRAN) syringe/vial injection 4 mg    ketorolac (TORADOL) injection 30 mg    DISCONTINUED: ketorolac (TORADOL) injection 30 mg     Influenza positive    Patient is a 40-year-old female present with the stated above, patient with a migraine-like headache however does complain of neck pain on exam no rigidity noted, patient without fever however concerning as patient does complain of body aches and headache being worse than usual.  At this time will trial Toradol and Zofran.  Patient will watch for monitor symptoms if patient develops a fever she will go immediately to the emergency room. Patient and/or guardian given precautionary s/sx that mandate immediate follow up and evaluation in the ED. Advised of risks of not doing so.  Side effects of the above medications discussed.   DDX, Supportive care, and indications for immediate follow-up discussed with patient.    Instructed to return to clinic or nearest emergency department if we are not available for any change in condition, further concerns, or worsening of symptoms.    The patient and/or guardian demonstrated a good understanding and agreed with the treatment plan.    Please note that this dictation was created using voice  recognition software. I have made every reasonable attempt to correct obvious errors, but I expect that there are errors of grammar and possibly content that I did not discover before finalizing the note.

## 2022-04-28 ENCOUNTER — HOSPITAL ENCOUNTER (OUTPATIENT)
Facility: MEDICAL CENTER | Age: 42
End: 2022-04-28
Attending: INTERNAL MEDICINE
Payer: COMMERCIAL

## 2022-04-28 ENCOUNTER — OFFICE VISIT (OUTPATIENT)
Dept: URGENT CARE | Facility: PHYSICIAN GROUP | Age: 42
End: 2022-04-28
Payer: COMMERCIAL

## 2022-04-28 VITALS
TEMPERATURE: 96.9 F | HEIGHT: 66 IN | DIASTOLIC BLOOD PRESSURE: 90 MMHG | RESPIRATION RATE: 16 BRPM | OXYGEN SATURATION: 99 % | BODY MASS INDEX: 38.57 KG/M2 | WEIGHT: 240 LBS | SYSTOLIC BLOOD PRESSURE: 132 MMHG | HEART RATE: 102 BPM

## 2022-04-28 DIAGNOSIS — R05.9 COUGH: ICD-10-CM

## 2022-04-28 DIAGNOSIS — U07.1 COVID-19 VIRUS INFECTION: ICD-10-CM

## 2022-04-28 LAB
EXTERNAL QUALITY CONTROL: ABNORMAL
SARS-COV+SARS-COV-2 AG RESP QL IA.RAPID: POSITIVE

## 2022-04-28 PROCEDURE — U0003 INFECTIOUS AGENT DETECTION BY NUCLEIC ACID (DNA OR RNA); SEVERE ACUTE RESPIRATORY SYNDROME CORONAVIRUS 2 (SARS-COV-2) (CORONAVIRUS DISEASE [COVID-19]), AMPLIFIED PROBE TECHNIQUE, MAKING USE OF HIGH THROUGHPUT TECHNOLOGIES AS DESCRIBED BY CMS-2020-01-R: HCPCS

## 2022-04-28 PROCEDURE — U0005 INFEC AGEN DETEC AMPLI PROBE: HCPCS

## 2022-04-28 PROCEDURE — 99213 OFFICE O/P EST LOW 20 MIN: CPT | Mod: CS | Performed by: INTERNAL MEDICINE

## 2022-04-28 PROCEDURE — 87426 SARSCOV CORONAVIRUS AG IA: CPT | Performed by: INTERNAL MEDICINE

## 2022-04-28 RX ORDER — DEXTROMETHORPHAN HYDROBROMIDE AND PROMETHAZINE HYDROCHLORIDE 15; 6.25 MG/5ML; MG/5ML
5 SYRUP ORAL EVERY 6 HOURS PRN
Qty: 100 ML | Refills: 0 | Status: SHIPPED | OUTPATIENT
Start: 2022-04-28

## 2022-04-28 ASSESSMENT — ENCOUNTER SYMPTOMS
COUGH: 1
NAUSEA: 0
CHILLS: 1
HEMOPTYSIS: 0
FEVER: 1
VOMITING: 0

## 2022-04-29 DIAGNOSIS — U07.1 COVID-19 VIRUS INFECTION: ICD-10-CM

## 2022-04-29 LAB
COVID ORDER STATUS COVID19: NORMAL
SARS-COV-2 RNA RESP QL NAA+PROBE: DETECTED
SPECIMEN SOURCE: ABNORMAL

## 2022-04-29 NOTE — PROGRESS NOTES
Chief Complaint:      HPI:      Nicole Ramirez is a 42 y.o. female with suspected COVID-19 infection.  She reports that her partner was seen at the urgent care yesterday and tested positive for COVID-19 and she did a home test which turned out positive and she is requesting antiviral therapy.    She reports symptoms such as headache, fatigue, cough, brain fog, increasing anger.  She denies any chronic liver disease and chronic kidney disease.  She denies having any chronic lung disease, liver cirrhosis, type I or type 2 diabetes and she denies heart conditions such as heart failure coronary artery disease or cardiomyopathy.    She reports that she received 2 COVID vaccines and a booster shot.      ROS:   Review of Systems   Constitutional: Positive for chills, fever and malaise/fatigue.   Respiratory: Positive for cough. Negative for hemoptysis.    Gastrointestinal: Negative for nausea and vomiting.        Past Medical History:  She   Patient Active Problem List    Diagnosis Date Noted   • Anxiety 07/02/2019   • Need for Tdap vaccination 12/11/2018   • Connective tissue disease, undifferentiated (HCC) 12/11/2018   • Eosinophilic esophagitis 12/11/2018   • Vitamin D deficiency 12/11/2018   • Decreased hearing of both ears 12/11/2018   • Obesity (BMI 30-39.9) 07/31/2017   • Preventative health care 08/31/2016   • Fibromyalgia 04/29/2016     Past Surgical History:  She   Past Surgical History:   Procedure Laterality Date   • PRASANNA BY LAPAROSCOPY N/A 8/1/2017    Procedure: PRASANNA BY LAPAROSCOPY;  Surgeon: Rob Hanson M.D.;  Location: SURGERY [facility];  Service:    • PLASTIC SURGERY  2007    breast reduction   • PRIMARY C SECTION        Allergies:  She Bee, Morphine, Other misc, and Wellbutrin [bupropion]   Current Medications:  She   Current Outpatient Medications:   •  DULoxetine (CYMBALTA) 30 MG Cap DR Particles, Take 30 mg by mouth every day., Disp: , Rfl:   •  amLODIPine (NORVASC) 2.5 MG Tab, ,  Disp: , Rfl:   •  Taurine 1000 MG Cap, Take  by mouth., Disp: , Rfl:   •  MAGNESIUM PO, Take 400 mg by mouth., Disp: , Rfl:   •  Cyanocobalamin (B-12) 100 MCG Tab, Take  by mouth., Disp: , Rfl:   •  famotidine (PEPCID) 20 MG Tab, Take 20 mg by mouth 2 times a day., Disp: , Rfl:   •  hydroxychloroquine (PLAQUENIL) 200 MG Tab, Take 200 mg by mouth 2 times a day., Disp: , Rfl:   •  vitamin D (CHOLECALCIFEROL) 1000 UNIT Tab, Take 1,000 Units by mouth every day., Disp: , Rfl:   •  orphenadrine (NORFLEX) 100 MG tablet, Take 300 mg by mouth 2 times a day. (Patient not taking: Reported on 2022), Disp: , Rfl:   •  gabapentin (NEURONTIN) 300 MG Cap, Take 300 mg by mouth 3 times a day. (Patient not taking: Reported on 2022), Disp: , Rfl:   Social History:  She   Social History     Socioeconomic History   • Marital status: Single     Spouse name: Not on file   • Number of children: Not on file   • Years of education: Not on file   • Highest education level: Not on file   Occupational History   • Not on file   Tobacco Use   • Smoking status: Former Smoker     Years: 14.00     Types: Cigarettes     Quit date: 6/3/2008     Years since quittin.9   • Smokeless tobacco: Never Used   Substance and Sexual Activity   • Alcohol use: No     Alcohol/week: 0.0 oz   • Drug use: No   • Sexual activity: Yes     Partners: Female     Comment: , two bio, three step   Other Topics Concern   • Not on file   Social History Narrative    Lives with 2 children, mother and best friend.     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      Social Determinants of Health     Financial Resource Strain: Not on file   Food Insecurity: Not on file   Transportation Needs: Not on file   Physical Activity: Not on file   Stress: Not on file   Social Connections: Not on file   Intimate Partner Violence: Not on file   Housing Stability: Not on file      Family History:   Her   Family History   Problem Relation Age of Onset   • Heart  "Disease Mother    • Hypertension Mother    • Hyperlipidemia Mother    • Cancer Father         skin   • Cancer Paternal Grandmother         skin   • Cancer Maternal Grandmother         bowel   • Heart Disease Maternal Grandmother    • Hypertension Maternal Grandmother    • Heart Disease Maternal Aunt    • Heart Disease Maternal Uncle    • Hypertension Maternal Aunt    • Hypertension Maternal Uncle    • Heart Disease Maternal Grandfather    • Stroke Maternal Aunt    • Diabetes Neg Hx    • Psychiatric Illness Neg Hx    • Thyroid Neg Hx         PHYSICAL EXAM:    Vital signs: /90 (BP Location: Left arm, Patient Position: Sitting, BP Cuff Size: Adult)   Pulse (!) 102   Temp 36.1 °C (96.9 °F) (Temporal)   Resp 16   Ht 1.676 m (5' 6\")   Wt 109 kg (240 lb)   SpO2 99%   BMI 38.74 kg/m²   Physical Exam  Constitutional:       Appearance: She is obese.   HENT:      Head: Normocephalic and atraumatic.      Right Ear: Tympanic membrane normal.      Left Ear: Tympanic membrane normal.      Nose: Nose normal. No congestion or rhinorrhea.      Mouth/Throat:      Mouth: Mucous membranes are moist.      Pharynx: Oropharynx is clear. No oropharyngeal exudate or posterior oropharyngeal erythema.   Eyes:      Extraocular Movements: Extraocular movements intact.      Conjunctiva/sclera: Conjunctivae normal.      Pupils: Pupils are equal, round, and reactive to light.   Cardiovascular:      Rate and Rhythm: Normal rate and regular rhythm.      Pulses: Normal pulses.      Heart sounds: Normal heart sounds.   Pulmonary:      Effort: Pulmonary effort is normal.      Breath sounds: Normal breath sounds.   Musculoskeletal:      Cervical back: Normal range of motion and neck supple.   Neurological:      Mental Status: She is alert.         Labs:  Lab Results   Component Value Date/Time    HBA1C 5.4 12/18/2018 08:52 AM      Lab Results   Component Value Date/Time    CHOLSTRLTOT 146 12/18/2018 0852    TRIGLYCERIDE 52 12/18/2018 0852    " HDL 53 12/18/2018 0852    LDL 83 12/18/2018 0852     No results found for: MICROALBCALC, MALBCRT, MALBEXCR, BQPSQP86, MICROALBUR, MICRALB, UMICROALBUM, MICROALBTIM   Lab Results   Component Value Date/Time    CREATININE 1.02 12/18/2018 08:52 AM           ASSESSMENT/PLAN:   1. COVID-19 virus infection  Point-of-care COVID-19 test was positive  We will cancel PCR  We will start patient on Paxlovid  Gave the patient copies of the EUA fact sheet for the medication  Discussed the potential side effects of the medication  Discussed with patient that she needs to go to the emergency room if she has worsening symptoms or hypoxemia    - POCT SARS-COV Antigen VIKTOR (Symptomatic only)  - COVID/SARS CoV-2 PCR; Future    2. Cough  Start Promethazine DM for cough      Return if symptoms worsen or fail to improve.      This patient during there office visit was started on new medication.  Side effects of new medications were discussed with the patient today in the office. The patient was supplied paperwork on this new medication.    Red flags discussed and when to RTC or seek care in the ER  Supportive care, differential diagnoses, and indications for immediate follow-up discussed with patient.    Pathogenesis of diagnosis discussed including typical length and natural progression.       Instructed to return to  or nearest emergency department if symptoms fail to improve, for any change in condition, further concerns, or new concerning symptoms.  Patient states understanding of the plan of care and discharge instructions.      Paul Woo MD, FACE, [facility]  04/28/22

## 2022-09-21 ENCOUNTER — OFFICE VISIT (OUTPATIENT)
Dept: URGENT CARE | Facility: PHYSICIAN GROUP | Age: 42
End: 2022-09-21
Payer: COMMERCIAL

## 2022-09-21 VITALS
HEIGHT: 66 IN | RESPIRATION RATE: 18 BRPM | WEIGHT: 235 LBS | TEMPERATURE: 97.9 F | OXYGEN SATURATION: 99 % | SYSTOLIC BLOOD PRESSURE: 136 MMHG | BODY MASS INDEX: 37.77 KG/M2 | HEART RATE: 85 BPM | DIASTOLIC BLOOD PRESSURE: 86 MMHG

## 2022-09-21 DIAGNOSIS — M35.9 CONNECTIVE TISSUE DISEASE, UNDIFFERENTIATED (HCC): ICD-10-CM

## 2022-09-21 DIAGNOSIS — U07.1 COVID: Primary | ICD-10-CM

## 2022-09-21 PROCEDURE — 99213 OFFICE O/P EST LOW 20 MIN: CPT | Mod: CS | Performed by: EMERGENCY MEDICINE

## 2022-09-21 RX ORDER — METRONIDAZOLE 500 MG/1
TABLET ORAL
COMMUNITY
Start: 2022-06-30

## 2022-09-21 ASSESSMENT — ENCOUNTER SYMPTOMS
SORE THROAT: 1
VOMITING: 0
FEVER: 0
NAUSEA: 0
COUGH: 1

## 2022-09-21 NOTE — PROGRESS NOTES
"  Subjective:     Nicole Patterson  is a 42 y.o. female who presents for Coronavirus Screening (Tested postive today with home test, runny nose sorethroat x1day.)       Patient is a 42-year-old female who presents for positive COVID, requesting Paxlovid treatment.  She has 5 kids, and states that this is her second COVID bout this year, back in April she had COVID as well and was treated with Paxlovid which she tolerated fine.  She states she has had some upper respiratory symptoms, she had a headache this morning but that resolved with Tylenol.  She denies any nausea or vomiting, she has no fever, no shortness of breath.  She does vape.  She does have underlying undifferentiated connective tissue disorder/?  Lupus, among multiple other medical problems.   Review of Systems   Constitutional:  Negative for fever and malaise/fatigue.   HENT:  Positive for congestion and sore throat.    Respiratory:  Positive for cough.    Gastrointestinal:  Negative for nausea and vomiting.   All other systems reviewed and are negative.   Allergies   Allergen Reactions    Bee Anaphylaxis    Morphine Shortness of Breath    Other Misc Rash     Vicryl  \"Redness and inflammation\".      Wellbutrin [Bupropion]      Rapid HR     Past Medical History:   Diagnosis Date    Allergy     Muscle spasm     Sinusitis, chronic         Objective:   /80   Pulse 85   Temp 36.6 °C (97.9 °F)   Resp 18   Ht 1.676 m (5' 6\")   Wt 107 kg (235 lb)   SpO2 99%   BMI 37.93 kg/m²   Physical Exam  Vitals and nursing note reviewed.   Constitutional:       Appearance: Normal appearance. She is obese. She is not ill-appearing or toxic-appearing.   HENT:      Head: Normocephalic and atraumatic.      Mouth/Throat:      Mouth: Mucous membranes are moist.      Pharynx: No oropharyngeal exudate or posterior oropharyngeal erythema.   Eyes:      General: No scleral icterus.        Right eye: No discharge.         Left eye: No discharge.   Cardiovascular:      " Rate and Rhythm: Normal rate and regular rhythm.      Heart sounds: Normal heart sounds. No murmur heard.  Pulmonary:      Effort: Pulmonary effort is normal. No respiratory distress.      Breath sounds: Normal breath sounds. No wheezing or rhonchi.   Musculoskeletal:      Cervical back: Normal range of motion and neck supple.      Right lower leg: No edema.      Left lower leg: No edema.   Lymphadenopathy:      Cervical: No cervical adenopathy.   Skin:     General: Skin is warm and dry.      Coloration: Skin is not jaundiced.      Findings: No rash.   Neurological:      General: No focal deficit present.      Mental Status: She is alert.   Psychiatric:         Mood and Affect: Mood normal.         Behavior: Behavior normal.         Thought Content: Thought content normal.         Assessment/Plan:     Encounter Diagnoses   Name Primary?    COVID Yes    Connective tissue disease, undifferentiated (HCC)        Patient with positive home COVID test, no concerning symptoms, stable vitals, reassuring exam.  Patient requesting Paxlovid as she has the underlying connective tissue disorder as a risk.  Patient will be given a Paxlovid prescription, she will continue    Assessment    1. COVID    2. Connective tissue disease, undifferentiated (HCC)    See AVS Instructions below for written guidance provided to patient on after-visit management and care in addition to our verbal discussion during the visit.    Please note that this dictation was created using voice recognition software. I have attempted to correct all errors, but there may be sound-alike, spelling, grammar and possibly content errors that I did not discover before finalizing the note.

## 2022-09-21 NOTE — PATIENT INSTRUCTIONS
Because you have underlying medical problems that place you at higher risk with covid, you are eligible for treatment with the paxlovid drug regimen.    If started within 3 days of symptoms, it decreased the risk of covid-related hospital admission or death by 89%.    Paxlovid works best when it is started as soon as possible after diagnosis of covid, and definitely within 5 days of symptom onset.     Adverse reactions include diarrhea, elevated blood pressure and body aches. These risks were low (less than 5%).     If you have any of the contraindications below, you should NOT take paxlovid.  Contraindications to paxlovid include:  sensitivity to related antiviral drugs (nirmatrelvir or ritonavir)  taking paxlovid with specific drugs which can change the concentration of paxlovid in the blood.  Treatment with other medications including:    Alpha1-adrenoreceptor antagonist: alfuzosin  Analgesics: pethidine, piroxicam, propoxyphene  Antianginal: ranolazine  Antiarrhythmic: amiodarone, dronedarone, flecainide, propafenone, quinidine  Anti-gout: colchicine  Antipsychotics: lurasidone, pimozide, clozapine  Ergot derivatives: dihydroergotamine, ergotamine, methylergonovine  HMG-CoA reductase inhibitors: lovastatin, simvastatin  PDE5 inhibitor: sildenafil (Revatio®) when used for pulmonary arterial hypertension (PAH)  Sedative/hypnotics: triazolam, oral midazolam  Anticancer drugs: apalutamide   Anticonvulsant: carbamazepine, phenobarbital, phenytoin   Antimycobacterials: rifampin  Herbal products: Edmund’s Wort (hypericum perforatum)    Precautions when taking paxlovid:  Undiagnosed HIV infected patients treated with paxlovid may result in resistance to some of the medications used to treat HIV  There is some risk of liver injury resulting in hepatitis and jaundice.  If you have significant abdominal pain, vomiting, or notice your eyes / skin turning yellow, you should be evaluated promptly.    CDCCOVIDISORECS:  Regardless of vaccination status, you should isolate from others when you have COVID-19.  If you test positive for COVID-19, you stay home for at least 5 days and isolate from others in your home.  You are likely most infectious during these first 5 days. Wear a high-quality mask when you must be around others at home and in public.  screening testing of asymptomatic people without known exposures will no longer be recommended in most community settings.  physical distance is just one component of how to protect yourself and others.  It is important to consider the risk in a particular setting, including local COVID-19 Community Levels and the important role of ventilation, when assessing the need to maintain physical distance.    See CDC for additional information (updated August 11, 2022):  https://www.cdc.gov/media/releases/2022/p0811-covid-guidance.html

## 2023-10-26 NOTE — ED NOTES
1.  Please return to clinic at your next scheduled visit.  Contact the clinic ([phone]) at least 24 hours prior in the event you need to cancel.  2.  Do no harm to yourself or others.    3.  Avoid alcohol and drugs.    4.  Take all medications as prescribed.  Please contact the clinic with any concerns. If you are in need of medication refills, please call the clinic at [phone].    5. Should you want to get in touch with your provider, Dr. Riki Peterson, please utilize [employer] or contact the office ([phone]), and staff will be able to page Dr. Peterson directly.  6.  In the event you have personal crisis, contact the following crisis numbers: Suicide Prevention Hotline 1-222.481.5465; TATE Helpline 4-177-461-TATE; [facility] Emergency Room [phone]; text HELLO to 466055; or 990.                Presents with a C/O abdominal and chest pain for the past 3 days.  Reports a Hx of hiatal hernia.

## 2024-09-11 ENCOUNTER — OFFICE VISIT (OUTPATIENT)
Dept: URGENT CARE | Facility: PHYSICIAN GROUP | Age: 44
End: 2024-09-11
Payer: COMMERCIAL

## 2024-09-11 ENCOUNTER — APPOINTMENT (OUTPATIENT)
Dept: URGENT CARE | Facility: PHYSICIAN GROUP | Age: 44
End: 2024-09-11
Payer: COMMERCIAL

## 2024-09-11 ENCOUNTER — HOSPITAL ENCOUNTER (OUTPATIENT)
Facility: MEDICAL CENTER | Age: 44
End: 2024-09-11
Payer: COMMERCIAL

## 2024-09-11 VITALS
SYSTOLIC BLOOD PRESSURE: 126 MMHG | HEIGHT: 66 IN | RESPIRATION RATE: 16 BRPM | OXYGEN SATURATION: 97 % | WEIGHT: 235.6 LBS | TEMPERATURE: 97.7 F | DIASTOLIC BLOOD PRESSURE: 78 MMHG | BODY MASS INDEX: 37.86 KG/M2 | HEART RATE: 94 BPM

## 2024-09-11 DIAGNOSIS — R39.9 UTI SYMPTOMS: ICD-10-CM

## 2024-09-11 DIAGNOSIS — N30.01 ACUTE CYSTITIS WITH HEMATURIA: ICD-10-CM

## 2024-09-11 LAB
APPEARANCE UR: NORMAL
BILIRUB UR STRIP-MCNC: NORMAL MG/DL
COLOR UR AUTO: NORMAL
GLUCOSE UR STRIP.AUTO-MCNC: NORMAL MG/DL
KETONES UR STRIP.AUTO-MCNC: NORMAL MG/DL
LEUKOCYTE ESTERASE UR QL STRIP.AUTO: NORMAL
NITRITE UR QL STRIP.AUTO: POSITIVE
PH UR STRIP.AUTO: 6 [PH] (ref 5–8)
PROT UR QL STRIP: 100 MG/DL
RBC UR QL AUTO: NORMAL
SP GR UR STRIP.AUTO: 1.01
UROBILINOGEN UR STRIP-MCNC: 0.2 MG/DL

## 2024-09-11 PROCEDURE — 3078F DIAST BP <80 MM HG: CPT

## 2024-09-11 PROCEDURE — 81002 URINALYSIS NONAUTO W/O SCOPE: CPT

## 2024-09-11 PROCEDURE — 1125F AMNT PAIN NOTED PAIN PRSNT: CPT

## 2024-09-11 PROCEDURE — 87077 CULTURE AEROBIC IDENTIFY: CPT

## 2024-09-11 PROCEDURE — 87186 SC STD MICRODIL/AGAR DIL: CPT

## 2024-09-11 PROCEDURE — 99213 OFFICE O/P EST LOW 20 MIN: CPT

## 2024-09-11 PROCEDURE — 3074F SYST BP LT 130 MM HG: CPT

## 2024-09-11 PROCEDURE — 87086 URINE CULTURE/COLONY COUNT: CPT

## 2024-09-11 RX ORDER — PHENAZOPYRIDINE HYDROCHLORIDE 100 MG/1
100 TABLET, FILM COATED ORAL 3 TIMES DAILY PRN
Qty: 6 TABLET | Refills: 0 | Status: SHIPPED | OUTPATIENT
Start: 2024-09-11 | End: 2024-09-13

## 2024-09-11 RX ORDER — NITROFURANTOIN 25; 75 MG/1; MG/1
100 CAPSULE ORAL 2 TIMES DAILY
Qty: 10 CAPSULE | Refills: 0 | Status: SHIPPED | OUTPATIENT
Start: 2024-09-11 | End: 2024-09-16

## 2024-09-11 ASSESSMENT — ENCOUNTER SYMPTOMS
FEVER: 0
CHILLS: 0

## 2024-09-11 ASSESSMENT — PAIN SCALES - GENERAL: PAINLEVEL: 8=MODERATE-SEVERE PAIN

## 2024-09-14 LAB
BACTERIA UR CULT: ABNORMAL
BACTERIA UR CULT: ABNORMAL
SIGNIFICANT IND 70042: ABNORMAL
SITE SITE: ABNORMAL
SOURCE SOURCE: ABNORMAL

## 2024-11-17 ENCOUNTER — OFFICE VISIT (OUTPATIENT)
Dept: URGENT CARE | Facility: PHYSICIAN GROUP | Age: 44
End: 2024-11-17
Payer: COMMERCIAL

## 2024-11-17 VITALS
SYSTOLIC BLOOD PRESSURE: 122 MMHG | HEART RATE: 76 BPM | BODY MASS INDEX: 37.45 KG/M2 | OXYGEN SATURATION: 97 % | RESPIRATION RATE: 18 BRPM | WEIGHT: 233.03 LBS | HEIGHT: 66 IN | TEMPERATURE: 97.3 F | DIASTOLIC BLOOD PRESSURE: 84 MMHG

## 2024-11-17 DIAGNOSIS — S01.81XA FACIAL LACERATION, INITIAL ENCOUNTER: ICD-10-CM

## 2024-11-17 PROCEDURE — 12011 RPR F/E/E/N/L/M 2.5 CM/<: CPT | Performed by: PHYSICIAN ASSISTANT

## 2024-11-17 PROCEDURE — 99213 OFFICE O/P EST LOW 20 MIN: CPT | Mod: 25 | Performed by: PHYSICIAN ASSISTANT
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
## 2024-12-23 ENCOUNTER — APPOINTMENT (OUTPATIENT)
Dept: URGENT CARE | Facility: PHYSICIAN GROUP | Age: 44
End: 2024-12-23
Payer: COMMERCIAL

## 2025-06-20 ENCOUNTER — APPOINTMENT (OUTPATIENT)
Dept: URGENT CARE | Facility: PHYSICIAN GROUP | Age: 45
End: 2025-06-20
Payer: COMMERCIAL

## (undated) DEVICE — CANISTER SUCTION 3000ML MECHANICAL FILTER AUTO SHUTOFF MEDI-VAC NONSTERILE LF DISP  (40EA/CA)

## (undated) DEVICE — GLOVE, LITE (PAIR)

## (undated) DEVICE — LACTATED RINGERS INJ 1000 ML - (14EA/CA 60CA/PF)

## (undated) DEVICE — GOWN WARMING STANDARD FLEX - (30/CA)

## (undated) DEVICE — ELECTRODE DUAL RETURN W/ CORD - (50/PK)

## (undated) DEVICE — NEPTUNE 4 PORT MANIFOLD - (20/PK)

## (undated) DEVICE — GLOVE BIOGEL PI INDICATOR SZ 8.0 SURGICAL PF LF -(50/BX 4BX/CA)

## (undated) DEVICE — CANNULA W/SEAL11X100ZTHREAD - (12/BX)

## (undated) DEVICE — TUBING CLEARLINK DUO-VENT - C-FLO (48EA/CA)

## (undated) DEVICE — CLIP MED LG INTNL HRZN TI ESCP - (20/BX)

## (undated) DEVICE — GLOVE SZ 7.5 LF PROTEXIS (50PR/BX)

## (undated) DEVICE — TUBE NG SALEM SUMP 16FR (50EA/CA)

## (undated) DEVICE — SENSOR SPO2 NEO LNCS ADHESIVE (20/BX) SEE USER NOTES

## (undated) DEVICE — SUTURE GENERAL

## (undated) DEVICE — DRAPE SURG STERI-DRAPE 7X11OD - (40EA/CA)

## (undated) DEVICE — BAG RETRIEVAL 10ML (10EA/BX)

## (undated) DEVICE — PROTECTOR ULNA NERVE - (36PR/CA)

## (undated) DEVICE — DRAPESURG STERI-DRAPE LONG - (10/BX 4BX/CA)

## (undated) DEVICE — HEAD HOLDER JUNIOR/ADULT

## (undated) DEVICE — MASK ANESTHESIA ADULT  - (100/CA)

## (undated) DEVICE — CANISTER SUCTION RIGID RED 1500CC (40EA/CA)

## (undated) DEVICE — SET SUCTION/IRRIGATION WITH DISPOSABLE TIP (6/CA )PART #0250-070-520 IS A SUB

## (undated) DEVICE — SUCTION INSTRUMENT YANKAUER BULBOUS TIP W/O VENT (50EA/CA)

## (undated) DEVICE — SUTURE 0 ETHIBOND MO6 C/R - (12/BX) 8-18 INCH ETHICON

## (undated) DEVICE — TUBING INSUFFLATION - (10/BX)

## (undated) DEVICE — CATHETER IV 20 GA X 1-1/4 ---SURG.& SDS ONLY--- (50EA/BX)

## (undated) DEVICE — SUTURE 4-0 MONOCRYL PLUS PS-2 - 27 INCH (36/BX)

## (undated) DEVICE — SLEEVE, VASO, THIGH, MED

## (undated) DEVICE — DRESSING TRANSPARENT FILM TEGADERM 2.375 X 2.75"  (100EA/BX)"

## (undated) DEVICE — TROCAR 5X100 BLADED Z-THREAD - KII (6/BX)

## (undated) DEVICE — SPONGE GAUZESTER. 2X2 4-PL - (2/PK 50PK/BX 30BX/CS)

## (undated) DEVICE — KIT  I.V. START (100EA/CA)

## (undated) DEVICE — STOCKING THIGH HIGH LARGE - SHORT (6PR/BX)

## (undated) DEVICE — ELECTRODE 5MM LHK LAPSCP STERILE DISP- MEGADYNE  (5/CA)

## (undated) DEVICE — CANNULA W/SEAL 5X100 Z-THRE - ADED KII (12/BX)

## (undated) DEVICE — TUBING SETDISPOS HIGH FLOW II - (10/BX)

## (undated) DEVICE — GLOVE BIOGEL SZ 8 SURGICAL PF LTX - (50PR/BX 4BX/CA)

## (undated) DEVICE — Device

## (undated) DEVICE — GLOVE SURGICAL PROTEXIS PI 8.0 LF - (50PR/BX)

## (undated) DEVICE — TRAY SKIN SCRUB PVP WET (20EA/CA) PART #DYND70356 DISCONTINUED

## (undated) DEVICE — WATER IRRIGATION STERILE 1000ML (12EA/CA)

## (undated) DEVICE — KIT ANESTHESIA W/CIRCUIT & 3/LT BAG W/FILTER (20EA/CA)